# Patient Record
Sex: MALE | Race: ASIAN | NOT HISPANIC OR LATINO
[De-identification: names, ages, dates, MRNs, and addresses within clinical notes are randomized per-mention and may not be internally consistent; named-entity substitution may affect disease eponyms.]

---

## 2017-11-13 ENCOUNTER — APPOINTMENT (OUTPATIENT)
Dept: UROLOGY | Facility: CLINIC | Age: 57
End: 2017-11-13
Payer: COMMERCIAL

## 2017-11-13 DIAGNOSIS — Z00.00 ENCOUNTER FOR GENERAL ADULT MEDICAL EXAMINATION W/OUT ABNORMAL FINDINGS: ICD-10-CM

## 2017-11-13 DIAGNOSIS — N40.1 BENIGN PROSTATIC HYPERPLASIA WITH LOWER URINARY TRACT SYMPMS: ICD-10-CM

## 2017-11-13 LAB
ANION GAP SERPL CALC-SCNC: 15 MMOL/L
APPEARANCE: CLEAR
BACTERIA: NEGATIVE
BILIRUBIN URINE: NEGATIVE
BLOOD URINE: NEGATIVE
BUN SERPL-MCNC: 17 MG/DL
CALCIUM SERPL-MCNC: 10 MG/DL
CHLORIDE SERPL-SCNC: 101 MMOL/L
CO2 SERPL-SCNC: 25 MMOL/L
COLOR: YELLOW
CREAT SERPL-MCNC: 1.01 MG/DL
GLUCOSE QUALITATIVE U: NEGATIVE MG/DL
GLUCOSE SERPL-MCNC: 83 MG/DL
KETONES URINE: NEGATIVE
LEUKOCYTE ESTERASE URINE: NEGATIVE
MICROSCOPIC-UA: NORMAL
NITRITE URINE: NEGATIVE
PH URINE: 7
POTASSIUM SERPL-SCNC: 4.4 MMOL/L
PROTEIN URINE: NEGATIVE MG/DL
PSA FREE FLD-MCNC: 27.1
PSA FREE SERPL-MCNC: 0.29 NG/ML
PSA SERPL-MCNC: 1.08 NG/ML
RED BLOOD CELLS URINE: 2 /HPF
SODIUM SERPL-SCNC: 141 MMOL/L
SPECIFIC GRAVITY URINE: 1.01
SQUAMOUS EPITHELIAL CELLS: 0 /HPF
UROBILINOGEN URINE: NEGATIVE MG/DL
WHITE BLOOD CELLS URINE: 0 /HPF

## 2017-11-13 PROCEDURE — 99204 OFFICE O/P NEW MOD 45 MIN: CPT

## 2018-01-10 ENCOUNTER — APPOINTMENT (OUTPATIENT)
Dept: CARDIOLOGY | Facility: CLINIC | Age: 58
End: 2018-01-10

## 2018-12-10 ENCOUNTER — APPOINTMENT (OUTPATIENT)
Dept: INTERNAL MEDICINE | Facility: CLINIC | Age: 58
End: 2018-12-10
Payer: COMMERCIAL

## 2018-12-10 VITALS
DIASTOLIC BLOOD PRESSURE: 70 MMHG | HEIGHT: 69 IN | HEART RATE: 110 BPM | TEMPERATURE: 98.3 F | BODY MASS INDEX: 27.11 KG/M2 | OXYGEN SATURATION: 96 % | WEIGHT: 183 LBS | SYSTOLIC BLOOD PRESSURE: 120 MMHG

## 2018-12-10 PROCEDURE — 99203 OFFICE O/P NEW LOW 30 MIN: CPT

## 2018-12-27 ENCOUNTER — APPOINTMENT (OUTPATIENT)
Dept: INTERNAL MEDICINE | Facility: CLINIC | Age: 58
End: 2018-12-27

## 2019-01-10 ENCOUNTER — APPOINTMENT (OUTPATIENT)
Dept: INTERNAL MEDICINE | Facility: CLINIC | Age: 59
End: 2019-01-10
Payer: COMMERCIAL

## 2019-01-10 VITALS
HEIGHT: 69 IN | SYSTOLIC BLOOD PRESSURE: 110 MMHG | DIASTOLIC BLOOD PRESSURE: 70 MMHG | OXYGEN SATURATION: 98 % | BODY MASS INDEX: 27.11 KG/M2 | HEART RATE: 74 BPM | WEIGHT: 183 LBS | TEMPERATURE: 97.9 F

## 2019-01-10 PROCEDURE — 99213 OFFICE O/P EST LOW 20 MIN: CPT

## 2019-01-10 NOTE — REVIEW OF SYSTEMS
[Urticaria] : urticaria [Atopic Dermatitis] : atopic dermatitis [Pruritis] : pruritis [Nl] : Respiratory

## 2019-01-10 NOTE — ASSESSMENT
[FreeTextEntry1] : A 58 yrs old pt presents for F/U re\par 1) Chronic urticaria; Unable to tolerate Hydroxyzine due to sleepiness\par I have reviewed the pics that pt has shown me which are CW urticaria\par Trial of Allegra\par Have discussed trial of Xolair for which he is willing\par PA sent today\par \par 2) Contact Derm; Possibly re to Sourav sensitivity\par Pt advised to avoid exposure\par Will RTC for patch testing

## 2019-01-10 NOTE — PHYSICAL EXAM
[Well Nourished] : well nourished [Well Developed] : well developed [Sclera Not Icteric] : sclera not icteric [Conjunctival Erythema] : no conjunctival erythema [Suborbital Bogginess] : no suborbital bogginess (allergic shiners) [Boggy Nasal Turbinates] : no boggy and/or pale nasal turbinates [Pharyngeal erythema] : no pharyngeal erythema [Exudate] : no exudate [Posterior Pharyngeal Cobblestoning] : no posterior pharyngeal cobblestoning [Clear Rhinorrhea] : no clear rhinorrhea was seen [Wheezing] : no wheezing was heard [de-identified] : Area of contact derm on ant abd wall corresponding to Sourav exposureNo urticarial lesion at this time

## 2019-01-10 NOTE — HISTORY OF PRESENT ILLNESS
[de-identified] : The pt presents with C/O hives and itchy skin \par Is unable to use hydroxyzine due to sleepiness\par

## 2019-02-14 ENCOUNTER — MEDICATION RENEWAL (OUTPATIENT)
Age: 59
End: 2019-02-14

## 2019-03-06 ENCOUNTER — APPOINTMENT (OUTPATIENT)
Dept: CARDIOLOGY | Facility: CLINIC | Age: 59
End: 2019-03-06
Payer: COMMERCIAL

## 2019-03-06 ENCOUNTER — NON-APPOINTMENT (OUTPATIENT)
Age: 59
End: 2019-03-06

## 2019-03-06 VITALS
HEIGHT: 69 IN | HEART RATE: 60 BPM | DIASTOLIC BLOOD PRESSURE: 87 MMHG | BODY MASS INDEX: 27.25 KG/M2 | OXYGEN SATURATION: 98 % | SYSTOLIC BLOOD PRESSURE: 146 MMHG | WEIGHT: 184 LBS

## 2019-03-06 DIAGNOSIS — Z87.898 PERSONAL HISTORY OF OTHER SPECIFIED CONDITIONS: ICD-10-CM

## 2019-03-06 DIAGNOSIS — Z71.89 OTHER SPECIFIED COUNSELING: ICD-10-CM

## 2019-03-06 PROCEDURE — 93000 ELECTROCARDIOGRAM COMPLETE: CPT

## 2019-03-06 PROCEDURE — 99203 OFFICE O/P NEW LOW 30 MIN: CPT

## 2019-03-06 RX ORDER — HYDROXYZINE HYDROCHLORIDE 25 MG/1
25 TABLET ORAL
Qty: 60 | Refills: 1 | Status: DISCONTINUED | COMMUNITY
Start: 2018-12-10 | End: 2019-03-06

## 2019-03-06 RX ORDER — TRIAMCINOLONE ACETONIDE 1 MG/G
0.1 OINTMENT TOPICAL TWICE DAILY
Qty: 1 | Refills: 1 | Status: DISCONTINUED | COMMUNITY
Start: 2018-12-10 | End: 2019-03-06

## 2019-03-06 RX ORDER — PREDNISONE 20 MG/1
20 TABLET ORAL
Refills: 0 | Status: DISCONTINUED | COMMUNITY
End: 2019-03-06

## 2019-03-06 NOTE — HISTORY OF PRESENT ILLNESS
[FreeTextEntry1] : Patient with BPH and seasonal allergies presents for cardiac risk assessment. Currently doing well. Denies chest pain, shortness of breath or palpitations. Works in construction. Has a family history of cardiac issues. Mother had cardiomyopathy.

## 2019-03-06 NOTE — DISCUSSION/SUMMARY
[Hypertension] : hypertension [FreeTextEntry1] : \par Currently stable from a cardiovascular standpoint. Borderline hypertensive today. Euvolemic. Asymptomatic. Most recent lipid profile reviewed (nonfasting). Lipids acceptable at this time. Continue current care. For cardiac risk stratification, will schedule an exercise ECG stress test. In addition, will schedule an echocardiogram to assess his cardiac structures and function given borderline high blood pressure. Pending the test results, I will make further recommendations.

## 2019-03-06 NOTE — PHYSICAL EXAM
[General Appearance - Well Developed] : well developed [Normal Appearance] : normal appearance [Well Groomed] : well groomed [General Appearance - Well Nourished] : well nourished [No Deformities] : no deformities [General Appearance - In No Acute Distress] : no acute distress [Normal Conjunctiva] : the conjunctiva exhibited no abnormalities [Eyelids - No Xanthelasma] : the eyelids demonstrated no xanthelasmas [Normal Oral Mucosa] : normal oral mucosa [No Oral Pallor] : no oral pallor [No Oral Cyanosis] : no oral cyanosis [Heart Rate And Rhythm] : heart rate and rhythm were normal [Heart Sounds] : normal S1 and S2 [Murmurs] : no murmurs present [Edema] : no peripheral edema present [Respiration, Rhythm And Depth] : normal respiratory rhythm and effort [Exaggerated Use Of Accessory Muscles For Inspiration] : no accessory muscle use [Auscultation Breath Sounds / Voice Sounds] : lungs were clear to auscultation bilaterally [Abdomen Soft] : soft [Abdomen Tenderness] : non-tender [Abnormal Walk] : normal gait [Gait - Sufficient For Exercise Testing] : the gait was sufficient for exercise testing [Nail Clubbing] : no clubbing of the fingernails [Cyanosis, Localized] : no localized cyanosis [] : no ischemic changes [Skin Color & Pigmentation] : normal skin color and pigmentation [Oriented To Time, Place, And Person] : oriented to person, place, and time [FreeTextEntry1] : no carotid bruits or JVD

## 2019-04-24 ENCOUNTER — APPOINTMENT (OUTPATIENT)
Dept: CV DIAGNOSITCS | Facility: HOSPITAL | Age: 59
End: 2019-04-24
Payer: COMMERCIAL

## 2019-04-24 ENCOUNTER — OUTPATIENT (OUTPATIENT)
Dept: OUTPATIENT SERVICES | Facility: HOSPITAL | Age: 59
LOS: 1 days | End: 2019-04-24

## 2019-04-24 ENCOUNTER — APPOINTMENT (OUTPATIENT)
Dept: CV DIAGNOSTICS | Facility: HOSPITAL | Age: 59
End: 2019-04-24
Payer: COMMERCIAL

## 2019-04-24 DIAGNOSIS — I10 ESSENTIAL (PRIMARY) HYPERTENSION: ICD-10-CM

## 2019-04-24 PROCEDURE — 93018 CV STRESS TEST I&R ONLY: CPT | Mod: GC

## 2019-04-24 PROCEDURE — 93306 TTE W/DOPPLER COMPLETE: CPT | Mod: 26

## 2019-04-24 PROCEDURE — 93016 CV STRESS TEST SUPVJ ONLY: CPT | Mod: GC

## 2019-07-24 ENCOUNTER — MEDICATION RENEWAL (OUTPATIENT)
Age: 59
End: 2019-07-24

## 2020-08-11 ENCOUNTER — LABORATORY RESULT (OUTPATIENT)
Age: 60
End: 2020-08-11

## 2020-08-11 ENCOUNTER — APPOINTMENT (OUTPATIENT)
Dept: INTERNAL MEDICINE | Facility: CLINIC | Age: 60
End: 2020-08-11
Payer: COMMERCIAL

## 2020-08-11 VITALS — SYSTOLIC BLOOD PRESSURE: 120 MMHG | DIASTOLIC BLOOD PRESSURE: 70 MMHG

## 2020-08-11 PROCEDURE — 36415 COLL VENOUS BLD VENIPUNCTURE: CPT

## 2020-08-11 PROCEDURE — 99213 OFFICE O/P EST LOW 20 MIN: CPT | Mod: 25

## 2020-08-12 LAB
BASOPHILS # BLD AUTO: 0.06 K/UL
BASOPHILS NFR BLD AUTO: 1 %
EOSINOPHIL # BLD AUTO: 0.16 K/UL
EOSINOPHIL NFR BLD AUTO: 2.8 %
HCT VFR BLD CALC: 45.9 %
HGB BLD-MCNC: 14.5 G/DL
IMM GRANULOCYTES NFR BLD AUTO: 0.2 %
LYMPHOCYTES # BLD AUTO: 2.3 K/UL
LYMPHOCYTES NFR BLD AUTO: 40 %
MAN DIFF?: NORMAL
MCHC RBC-ENTMCNC: 29.7 PG
MCHC RBC-ENTMCNC: 31.6 GM/DL
MCV RBC AUTO: 94.1 FL
MONOCYTES # BLD AUTO: 0.48 K/UL
MONOCYTES NFR BLD AUTO: 8.3 %
NEUTROPHILS # BLD AUTO: 2.74 K/UL
NEUTROPHILS NFR BLD AUTO: 47.7 %
PLATELET # BLD AUTO: 224 K/UL
RBC # BLD: 4.88 M/UL
RBC # FLD: 12.4 %
WBC # FLD AUTO: 5.75 K/UL

## 2020-08-13 LAB
COMMON WASP (YELLOW JACKET) CLASS: 4
COMMON WASP (YELLOW JACKET) CONC: 27.2 KUA/L
HONEY BEE (I1) CLASS: NORMAL
HONEY BEE (I1) CONC: 0.19 KUA/L
PAPER WASP (I4) CLASS: 3
PAPER WASP (I4) CONC: 5.33 KUA/L

## 2020-08-13 NOTE — HISTORY OF PRESENT ILLNESS
[de-identified] : The pt states receiving a bee sting and developed anaphylatic reaction \par States this was his firt episode of bee sting anaphylaxis\par He was attended by EMS and taken to [facility]\par He had a generalised reaction to tick bite and has an Epi Pen\par

## 2020-08-13 NOTE — PHYSICAL EXAM
[Well Nourished] : well nourished [Normal Pupil & Iris Size/Symmetry] : normal pupil and iris size and symmetry [No Neck Mass] : no neck mass was observed [Normal Rate and Effort] : normal respiratory rhythm and effort [No LAD] : no lymphadenopathy [Normal S1, S2] : normal S1 and S2 [Normal Rate] : heart rate was normal  [Bilateral Audible Breath Sounds] : bilateral audible breath sounds [No murmur] : no murmur [Boggy Nasal Turbinates] : no boggy and/or pale nasal turbinates [Pharyngeal erythema] : no pharyngeal erythema [Posterior Pharyngeal Cobblestoning] : no posterior pharyngeal cobblestoning [Clear Rhinorrhea] : no clear rhinorrhea was seen [Exudate] : no exudate [Wheezing] : no wheezing was heard

## 2020-09-29 ENCOUNTER — APPOINTMENT (OUTPATIENT)
Dept: PEDIATRIC ALLERGY IMMUNOLOGY | Facility: CLINIC | Age: 60
End: 2020-09-29
Payer: COMMERCIAL

## 2020-09-29 VITALS
OXYGEN SATURATION: 97 % | BODY MASS INDEX: 26.36 KG/M2 | HEART RATE: 70 BPM | SYSTOLIC BLOOD PRESSURE: 128 MMHG | WEIGHT: 178.49 LBS | TEMPERATURE: 97.8 F | DIASTOLIC BLOOD PRESSURE: 86 MMHG

## 2020-09-29 DIAGNOSIS — L50.1 IDIOPATHIC URTICARIA: ICD-10-CM

## 2020-09-29 DIAGNOSIS — Z84.0 FAMILY HISTORY OF DISEASES OF THE SKIN AND SUBCUTANEOUS TISSUE: ICD-10-CM

## 2020-09-29 PROCEDURE — 99204 OFFICE O/P NEW MOD 45 MIN: CPT | Mod: 25

## 2020-09-29 PROCEDURE — 95044 PATCH/APPLICATION TESTS: CPT

## 2020-09-30 LAB
ALBUMIN SERPL ELPH-MCNC: 5.2 G/DL
ALP BLD-CCNC: 98 U/L
ALT SERPL-CCNC: 20 U/L
ANION GAP SERPL CALC-SCNC: 13 MMOL/L
AST SERPL-CCNC: 19 U/L
BILIRUB SERPL-MCNC: 0.4 MG/DL
BUN SERPL-MCNC: 15 MG/DL
CALCIUM SERPL-MCNC: 10.4 MG/DL
CHLORIDE SERPL-SCNC: 101 MMOL/L
CO2 SERPL-SCNC: 25 MMOL/L
CREAT SERPL-MCNC: 0.89 MG/DL
CRP SERPL-MCNC: <0.1 MG/DL
GLUCOSE SERPL-MCNC: 80 MG/DL
POTASSIUM SERPL-SCNC: 4.4 MMOL/L
PROT SERPL-MCNC: 8 G/DL
SODIUM SERPL-SCNC: 140 MMOL/L
THYROGLOB AB SERPL-ACNC: <20 IU/ML
THYROPEROXIDASE AB SERPL IA-ACNC: <10 IU/ML
TSH SERPL-ACNC: 1.04 UIU/ML

## 2020-10-01 ENCOUNTER — APPOINTMENT (OUTPATIENT)
Dept: PEDIATRIC ALLERGY IMMUNOLOGY | Facility: CLINIC | Age: 60
End: 2020-10-01
Payer: COMMERCIAL

## 2020-10-01 VITALS
TEMPERATURE: 97.4 F | WEIGHT: 178 LBS | BODY MASS INDEX: 26.36 KG/M2 | OXYGEN SATURATION: 97 % | DIASTOLIC BLOOD PRESSURE: 85 MMHG | HEART RATE: 77 BPM | SYSTOLIC BLOOD PRESSURE: 131 MMHG | HEIGHT: 69 IN

## 2020-10-01 PROCEDURE — 99213 OFFICE O/P EST LOW 20 MIN: CPT

## 2020-10-01 RX ORDER — PREDNISONE 10 MG/1
10 TABLET ORAL
Qty: 13 | Refills: 0 | Status: COMPLETED | COMMUNITY
Start: 2020-07-15

## 2020-10-01 NOTE — PHYSICAL EXAM
[Alert] : alert [Well Nourished] : well nourished [Healthy Appearance] : healthy appearance [No Acute Distress] : no acute distress [Well Developed] : well developed [Normal Pupil & Iris Size/Symmetry] : normal pupil and iris size and symmetry [No Discharge] : no discharge [No Photophobia] : no photophobia [Sclera Not Icteric] : sclera not icteric [Normal Outer Ear/Nose] : the ears and nose were normal in appearance [Supple] : the neck was supple [Normal Rate and Effort] : normal respiratory rhythm and effort [No Retractions] : no retractions [Normal Cervical Lymph Nodes] : cervical [Skin Intact] : skin intact  [No Rash] : no rash [No Skin Lesions] : no skin lesions [No Cyanosis] : no cyanosis [Normal Mood] : mood was normal [Normal Affect] : affect was normal [Alert, Awake, Oriented as Age-Appropriate] : alert, awake, oriented as age appropriate

## 2020-10-01 NOTE — REASON FOR VISIT
[Routine Follow-Up] : a routine follow-up visit for [FreeTextEntry2] : contact dermatitis, patch removal and initial patch test reading at 48 hours.

## 2020-10-01 NOTE — IMPRESSION
[FreeTextEntry1] : Patch test positive to:  \par Fragrance mix II, Polymyxin B sulfate, Cananga odorata, 1,3-diphenylguanidine, cetylsteaylalcohol, Fragrance Mix, Methyldibromo Glutaronitrile, Thimerosal, Gold Sodium Thiosulfate, ?Wool Alcohols, ? Tixocortol-21-Pivalate

## 2020-10-01 NOTE — REVIEW OF SYSTEMS
[Urticaria] : urticaria [Pruritus] : pruritus [Seizure] : no seizures [Headache] : no headache [Atopic Dermatitis] : no atopic dermatitis [Swelling] : no swelling [Nl] : Constitutional

## 2020-10-01 NOTE — HISTORY OF PRESENT ILLNESS
[Asthma] : asthma [Eczematous rashes] : eczematous rashes [Food Allergies] : food allergies [de-identified] : GILBERT ALCANTARA is a 60 year  old male with hymenoptera allergy and concern for allergic contact dermatitis, presents for patch removal and first patch test reading at 48 hours:\par \par Patient reports pruritus of patch test sites, no tenderness. Patient feels well today..\par \par He has a history of contact dermatitis with certain shampoos, lotions, bandaids. He had patch test in a distant past and reports positive tests to nickel and something else. He currently uses [employer] shampoo, which he tolerates.

## 2020-10-02 ENCOUNTER — APPOINTMENT (OUTPATIENT)
Dept: PEDIATRIC ALLERGY IMMUNOLOGY | Facility: CLINIC | Age: 60
End: 2020-10-02
Payer: COMMERCIAL

## 2020-10-02 VITALS
TEMPERATURE: 97.2 F | HEIGHT: 68.98 IN | WEIGHT: 179.99 LBS | HEART RATE: 79 BPM | BODY MASS INDEX: 26.66 KG/M2 | OXYGEN SATURATION: 98 % | SYSTOLIC BLOOD PRESSURE: 142 MMHG | DIASTOLIC BLOOD PRESSURE: 88 MMHG

## 2020-10-02 LAB
TOTAL IGE SMQN RAST: 42 KU/L
TRYPTASE: 1.6 NG/ML

## 2020-10-02 PROCEDURE — 99213 OFFICE O/P EST LOW 20 MIN: CPT

## 2020-10-02 NOTE — HISTORY OF PRESENT ILLNESS
[Asthma] : asthma [Food Allergies] : food allergies [de-identified] : 60 year old male being seen currently due to history of multiple contact type reactions that led to the placement of patch testing on Tuesday, which was read yesterday.  Patient has been itchy on the right side of the back, upper and lower. Also reports reactions when around alcohol based sanitizers at some times.

## 2020-10-02 NOTE — IMPRESSION
[FreeTextEntry1] : + : Fragrance mix, polymyxin B sulfate, cinnamic aldehyde, cananga adorata, cetylstearye alcohol, methyldibromo glutaonitrile, thimerosal, gold sodium theosulfate

## 2020-10-02 NOTE — REASON FOR VISIT
[Routine Follow-Up] : a routine follow-up visit for [FreeTextEntry2] : final reading of patch tests, contact dermatitis

## 2020-10-02 NOTE — PHYSICAL EXAM
[Alert] : alert [Well Nourished] : well nourished [Healthy Appearance] : healthy appearance [No Acute Distress] : no acute distress [Well Developed] : well developed [No Discharge] : no discharge [No Photophobia] : no photophobia [Sclera Not Icteric] : sclera not icteric [Normal Lips/Tongue] : the lips and tongue were normal [Normal Outer Ear/Nose] : the ears and nose were normal in appearance [No Nasal Discharge] : no nasal discharge [Supple] : the neck was supple [Normal Rate and Effort] : normal respiratory rhythm and effort [No Retractions] : no retractions [Bilateral Audible Breath Sounds] : bilateral audible breath sounds [Skin Intact] : skin intact  [No Rash] : no rash [Eczematous Patches] : no eczematous patches [Normal Mood] : mood was normal [Normal Affect] : affect was normal [Alert, Awake, Oriented as Age-Appropriate] : alert, awake, oriented as age appropriate

## 2020-10-04 NOTE — REVIEW OF SYSTEMS
[Urticaria] : urticaria [Pruritus] : pruritus [Nl] : Hematologic/Lymphatic [Fatigue] : no fatigue [Fever] : no fever [Seizure] : no seizures [Headache] : no headache [Atopic Dermatitis] : no atopic dermatitis [Swelling] : no swelling

## 2020-10-04 NOTE — CONSULT LETTER
[Dear  ___] : Dear  [unfilled], [Consult Letter:] : I had the pleasure of evaluating your patient, [unfilled]. [Please see my note below.] : Please see my note below. [Consult Closing:] : Thank you very much for allowing me to participate in the care of this patient.  If you have any questions, please do not hesitate to contact me. [Sincerely,] : Sincerely, [FreeTextEntry3] : Dayna Doe MD FAARENNY, ALEX\par Adult and Pediatric Allergy, Asthma and Clinical Immunology\par  of Medicine and Pediatrics at\par   [facility]\par Section Head, Adult Allergy and Immunology\par   [facility]\par   Division of Allergy, Asthma and Immunology\par   [address] [place] [address], [place] [address]\par   [place] [address]\par   Phone [phone]  Fax [phone]\par \par

## 2020-10-04 NOTE — PHYSICAL EXAM
[Alert] : alert [Well Nourished] : well nourished [Healthy Appearance] : healthy appearance [No Acute Distress] : no acute distress [No Discharge] : no discharge [No Photophobia] : no photophobia [Sclera Not Icteric] : sclera not icteric [Conjunctival Erythema] : no conjunctival erythema [Normal TMs] : both tympanic membranes were normal [Normal Lips/Tongue] : the lips and tongue were normal [Normal Outer Ear/Nose] : the ears and nose were normal in appearance [No Thrush] : no thrush [No Oral Lesions or Ulcers] : no oral lesions or ulcers [Pale mucosa] : pale mucosa [Boggy Nasal Turbinates] : no boggy and/or pale nasal turbinates [Pharyngeal erythema] : no pharyngeal erythema [Posterior Pharyngeal Cobblestoning] : no posterior pharyngeal cobblestoning [Exudate] : no exudate [No Neck Mass] : no neck mass was observed [Supple] : the neck was supple [Normal Rate and Effort] : normal respiratory rhythm and effort [Normal Palpation] : palpation of the chest revealed no abnormalities [No Crackles] : no crackles [No Retractions] : no retractions [Bilateral Audible Breath Sounds] : bilateral audible breath sounds [Wheezing] : no wheezing was heard [Normal Rate] : heart rate was normal  [Normal S1, S2] : normal S1 and S2 [Regular Rhythm] : with a regular rhythm [Soft] : abdomen soft [Not Tender] : non-tender [No HSM] : no hepato-splenomegaly [Normal Cervical Lymph Nodes] : cervical [Skin Intact] : skin intact  [No Rash] : no rash [Eczematous Patches] : no eczematous patches [No clubbing] : no clubbing [No Edema] : no edema [No Cyanosis] : no cyanosis [Normal Mood] : mood was normal [Normal Affect] : affect was normal [Alert, Awake, Oriented as Age-Appropriate] : alert, awake, oriented as age appropriate

## 2020-10-04 NOTE — HISTORY OF PRESENT ILLNESS
[Asthma] : asthma [Eczematous rashes] : eczematous rashes [Food Allergies] : food allergies [de-identified] : GILBERT ALCANTARA is a 60 year  old male, presents for allergy evaluation. HE was referred by Dr Navarrete for venom testing and IT. \par \par 7/13/20- was stung by a bee. Within a minute developed severe itching on the feet, generalized hives, then numbness and swelling of the face and eventually passed out in urgent care and was taken to [facility] where he stayed for 2 nights. \par He reports being stung multiple times before with only local  reactions. 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 \par \par About 2 years ago he was bitten by a tick and developed large local reaction, not sure due to a tick or creams. He took Fexofenadine 180 mg, hasn't taken any since 6/2020. He does get occasional itchy hives. \par \par He has a history of contact dermatitis with certain shampoos, lotions, bandaids. He had patch test in a distant past and reports positive tests to nickel and something else. He currently uses [employer] shampoo, which he tolerates.

## 2020-10-09 LAB
IGE RECEPTOR AB: 9.4 %
IGE RECEPTOR COMMENT: NORMAL

## 2020-10-22 ENCOUNTER — APPOINTMENT (OUTPATIENT)
Dept: PEDIATRIC ALLERGY IMMUNOLOGY | Facility: CLINIC | Age: 60
End: 2020-10-22
Payer: COMMERCIAL

## 2020-10-22 DIAGNOSIS — L23.0 ALLERGIC CONTACT DERMATITIS DUE TO METALS: ICD-10-CM

## 2020-10-22 DIAGNOSIS — L23.1 ALLERGIC CONTACT DERMATITIS DUE TO ADHESIVES: ICD-10-CM

## 2020-10-22 PROCEDURE — 99215 OFFICE O/P EST HI 40 MIN: CPT | Mod: 95

## 2020-10-22 NOTE — HISTORY OF PRESENT ILLNESS
[Home] : at home, [unfilled] , at the time of the visit. [Other Location: e.g. 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 No interval episodes of urticaria or bee stings. \par \par HYMENOPTERA VENOM ALLERGY:\par History of systemic reaction after a sting is an indication for further testing.\par - IgE positive to YELLOW JACKET AND WASP\par \par CONTACT DERMATITIS:\par Patch test positive to Fragrance mix , polymyxin B sulfate, cinnamic aldehyde, cananga odorata, cetylstearylalcohol, mehyldibromoglutanonitrile, thimerosal, ?- gold sodium  thiosulfate\par \par

## 2020-10-22 NOTE — REVIEW OF SYSTEMS
[Fatigue] : no fatigue [Fever] : no fever [Seizure] : no seizures [Headache] : no headache [Atopic Dermatitis] : no atopic dermatitis [Swelling] : no swelling [Nl] : Hematologic/Lymphatic [de-identified] : contact dermatitis

## 2020-10-22 NOTE — PHYSICAL EXAM
[Alert] : alert [No Acute Distress] : no acute distress [Normal Voice/Communication] : normal voice communication [Normal Rate and Effort] : normal respiratory rhythm and effort [Normal Mood] : mood was normal [Normal Affect] : affect was normal [Alert, Awake, Oriented as Age-Appropriate] : alert, awake, oriented as age appropriate

## 2020-11-05 ENCOUNTER — TRANSCRIPTION ENCOUNTER (OUTPATIENT)
Age: 60
End: 2020-11-05

## 2020-11-17 ENCOUNTER — APPOINTMENT (OUTPATIENT)
Dept: PEDIATRIC ALLERGY IMMUNOLOGY | Facility: CLINIC | Age: 60
End: 2020-11-17
Payer: COMMERCIAL

## 2020-11-17 VITALS
DIASTOLIC BLOOD PRESSURE: 87 MMHG | TEMPERATURE: 97.1 F | HEIGHT: 69 IN | HEART RATE: 62 BPM | WEIGHT: 180 LBS | BODY MASS INDEX: 26.66 KG/M2 | OXYGEN SATURATION: 98 % | SYSTOLIC BLOOD PRESSURE: 138 MMHG

## 2020-11-17 DIAGNOSIS — Z01.89 ENCOUNTER FOR OTHER SPECIFIED SPECIAL EXAMINATIONS: ICD-10-CM

## 2020-11-17 DIAGNOSIS — L23.89 ALLERGIC CONTACT DERMATITIS DUE TO OTHER AGENTS: ICD-10-CM

## 2020-11-17 PROCEDURE — 99214 OFFICE O/P EST MOD 30 MIN: CPT | Mod: 25

## 2020-11-17 PROCEDURE — 95017 ALL TSTG PERQ&IQ W/VENOMS: CPT

## 2020-11-20 NOTE — PHYSICAL EXAM
[Alert] : alert [Well Nourished] : well nourished [Healthy Appearance] : healthy appearance [No Acute Distress] : no acute distress [Normal Voice/Communication] : normal voice communication [Sclera Not Icteric] : sclera not icteric [Conjunctival Erythema] : no conjunctival erythema [No Thrush] : no thrush [No Oral Lesions or Ulcers] : no oral lesions or ulcers [Exudate] : no exudate [Normal Rate and Effort] : normal respiratory rhythm and effort [No Crackles] : no crackles [Bilateral Audible Breath Sounds] : bilateral audible breath sounds [Wheezing] : no wheezing was heard [Normal Rate] : heart rate was normal  [Regular Rhythm] : with a regular rhythm [Soft] : abdomen soft [Not Tender] : non-tender [Normal Cervical Lymph Nodes] : cervical [No Rash] : no rash [Eczematous Patches] : no eczematous patches [No clubbing] : no clubbing [No Cyanosis] : no cyanosis [Normal Mood] : mood was normal [Normal Affect] : affect was normal [Alert, Awake, Oriented as Age-Appropriate] : alert, awake, oriented as age appropriate

## 2020-11-20 NOTE — REVIEW OF SYSTEMS
[Fatigue] : no fatigue [Fever] : no fever [Seizure] : no seizures [Headache] : no headache [Atopic Dermatitis] : no atopic dermatitis [Swelling] : no swelling [Nl] : Hematologic/Lymphatic [de-identified] : contact dermatitis

## 2020-11-20 NOTE — HISTORY OF PRESENT ILLNESS
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 No interval episodes of urticaria or bee stings. \par - returns to complete venom testing\par \par HYMENOPTERA VENOM ALLERGY:\par History of systemic reaction after a sting is an indication for further testing.\par - IgE positive to YELLOW JACKET AND WASP\par \par CONTACT DERMATITIS:\par Patch test positive to Fragrance mix , polymyxin B sulfate, cinnamic aldehyde, cananga odorata, cetylstearylalcohol, mehyldibromoglutanonitrile, thimerosal, ?- gold sodium  thiosulfate\par \par

## 2020-11-20 NOTE — REASON FOR VISIT
[Routine Follow-Up] : a routine follow-up visit for [To Insect Venom] : allergy to insect venom [Spouse] : spouse

## 2020-12-30 ENCOUNTER — NON-APPOINTMENT (OUTPATIENT)
Age: 60
End: 2020-12-30

## 2021-01-06 ENCOUNTER — APPOINTMENT (OUTPATIENT)
Dept: PEDIATRIC ALLERGY IMMUNOLOGY | Facility: CLINIC | Age: 61
End: 2021-01-06
Payer: COMMERCIAL

## 2021-01-06 PROCEDURE — 95149Z: CUSTOM

## 2021-01-06 PROCEDURE — 95117 IMMUNOTHERAPY INJECTIONS: CPT

## 2021-01-06 PROCEDURE — 99072 ADDL SUPL MATRL&STAF TM PHE: CPT

## 2021-01-12 ENCOUNTER — APPOINTMENT (OUTPATIENT)
Dept: PEDIATRIC ALLERGY IMMUNOLOGY | Facility: CLINIC | Age: 61
End: 2021-01-12
Payer: COMMERCIAL

## 2021-01-12 PROCEDURE — 95149Z: CUSTOM

## 2021-01-12 PROCEDURE — 95117 IMMUNOTHERAPY INJECTIONS: CPT

## 2021-01-12 PROCEDURE — 99072 ADDL SUPL MATRL&STAF TM PHE: CPT

## 2021-01-21 ENCOUNTER — APPOINTMENT (OUTPATIENT)
Dept: PEDIATRIC ALLERGY IMMUNOLOGY | Facility: CLINIC | Age: 61
End: 2021-01-21
Payer: COMMERCIAL

## 2021-01-21 PROCEDURE — 95149Z: CUSTOM

## 2021-01-21 PROCEDURE — 95117 IMMUNOTHERAPY INJECTIONS: CPT

## 2021-01-21 PROCEDURE — 99072 ADDL SUPL MATRL&STAF TM PHE: CPT

## 2021-01-27 ENCOUNTER — APPOINTMENT (OUTPATIENT)
Dept: PEDIATRIC ALLERGY IMMUNOLOGY | Facility: CLINIC | Age: 61
End: 2021-01-27
Payer: COMMERCIAL

## 2021-01-27 PROCEDURE — 99072 ADDL SUPL MATRL&STAF TM PHE: CPT

## 2021-01-27 PROCEDURE — 95117 IMMUNOTHERAPY INJECTIONS: CPT

## 2021-01-27 PROCEDURE — 95149Z: CUSTOM

## 2021-02-03 ENCOUNTER — APPOINTMENT (OUTPATIENT)
Dept: PEDIATRIC ALLERGY IMMUNOLOGY | Facility: CLINIC | Age: 61
End: 2021-02-03
Payer: COMMERCIAL

## 2021-02-03 PROCEDURE — 95149Z: CUSTOM

## 2021-02-03 PROCEDURE — 99072 ADDL SUPL MATRL&STAF TM PHE: CPT

## 2021-02-03 PROCEDURE — 95117 IMMUNOTHERAPY INJECTIONS: CPT

## 2021-02-11 ENCOUNTER — APPOINTMENT (OUTPATIENT)
Dept: PEDIATRIC ALLERGY IMMUNOLOGY | Facility: CLINIC | Age: 61
End: 2021-02-11
Payer: COMMERCIAL

## 2021-02-11 PROCEDURE — 95149Z: CUSTOM

## 2021-02-11 PROCEDURE — 95117 IMMUNOTHERAPY INJECTIONS: CPT

## 2021-02-11 PROCEDURE — 99072 ADDL SUPL MATRL&STAF TM PHE: CPT

## 2021-02-16 ENCOUNTER — APPOINTMENT (OUTPATIENT)
Dept: PEDIATRIC ALLERGY IMMUNOLOGY | Facility: CLINIC | Age: 61
End: 2021-02-16

## 2021-02-17 ENCOUNTER — NON-APPOINTMENT (OUTPATIENT)
Age: 61
End: 2021-02-17

## 2021-02-17 ENCOUNTER — APPOINTMENT (OUTPATIENT)
Dept: PEDIATRIC ALLERGY IMMUNOLOGY | Facility: CLINIC | Age: 61
End: 2021-02-17
Payer: COMMERCIAL

## 2021-02-17 PROCEDURE — 99072 ADDL SUPL MATRL&STAF TM PHE: CPT

## 2021-02-17 PROCEDURE — 95149Z: CUSTOM

## 2021-02-17 PROCEDURE — 95117 IMMUNOTHERAPY INJECTIONS: CPT

## 2021-02-24 ENCOUNTER — APPOINTMENT (OUTPATIENT)
Dept: PEDIATRIC ALLERGY IMMUNOLOGY | Facility: CLINIC | Age: 61
End: 2021-02-24
Payer: COMMERCIAL

## 2021-02-24 PROCEDURE — 95149Z: CUSTOM

## 2021-02-24 PROCEDURE — 95117 IMMUNOTHERAPY INJECTIONS: CPT

## 2021-02-24 PROCEDURE — 99072 ADDL SUPL MATRL&STAF TM PHE: CPT

## 2021-03-03 ENCOUNTER — APPOINTMENT (OUTPATIENT)
Dept: PEDIATRIC ALLERGY IMMUNOLOGY | Facility: CLINIC | Age: 61
End: 2021-03-03
Payer: COMMERCIAL

## 2021-03-03 PROCEDURE — 99072 ADDL SUPL MATRL&STAF TM PHE: CPT

## 2021-03-03 PROCEDURE — 95117 IMMUNOTHERAPY INJECTIONS: CPT

## 2021-03-03 PROCEDURE — 95149Z: CUSTOM

## 2021-03-10 ENCOUNTER — APPOINTMENT (OUTPATIENT)
Dept: PEDIATRIC ALLERGY IMMUNOLOGY | Facility: CLINIC | Age: 61
End: 2021-03-10

## 2021-03-17 ENCOUNTER — APPOINTMENT (OUTPATIENT)
Dept: PEDIATRIC ALLERGY IMMUNOLOGY | Facility: CLINIC | Age: 61
End: 2021-03-17
Payer: COMMERCIAL

## 2021-03-17 PROCEDURE — 99072 ADDL SUPL MATRL&STAF TM PHE: CPT

## 2021-03-17 PROCEDURE — 95149Z: CUSTOM

## 2021-03-17 PROCEDURE — 95117 IMMUNOTHERAPY INJECTIONS: CPT

## 2021-03-24 ENCOUNTER — APPOINTMENT (OUTPATIENT)
Dept: PEDIATRIC ALLERGY IMMUNOLOGY | Facility: CLINIC | Age: 61
End: 2021-03-24
Payer: COMMERCIAL

## 2021-03-24 PROCEDURE — 95149Z: CUSTOM

## 2021-03-24 PROCEDURE — 99072 ADDL SUPL MATRL&STAF TM PHE: CPT

## 2021-03-24 PROCEDURE — 95117 IMMUNOTHERAPY INJECTIONS: CPT

## 2021-03-31 ENCOUNTER — APPOINTMENT (OUTPATIENT)
Dept: PEDIATRIC ALLERGY IMMUNOLOGY | Facility: CLINIC | Age: 61
End: 2021-03-31
Payer: COMMERCIAL

## 2021-03-31 PROCEDURE — 95117 IMMUNOTHERAPY INJECTIONS: CPT

## 2021-03-31 PROCEDURE — 95165 ANTIGEN THERAPY SERVICES: CPT

## 2021-04-07 ENCOUNTER — APPOINTMENT (OUTPATIENT)
Dept: PEDIATRIC ALLERGY IMMUNOLOGY | Facility: CLINIC | Age: 61
End: 2021-04-07
Payer: COMMERCIAL

## 2021-04-07 PROCEDURE — 95165 ANTIGEN THERAPY SERVICES: CPT

## 2021-04-07 PROCEDURE — 95117 IMMUNOTHERAPY INJECTIONS: CPT

## 2021-04-14 ENCOUNTER — APPOINTMENT (OUTPATIENT)
Dept: PEDIATRIC ALLERGY IMMUNOLOGY | Facility: CLINIC | Age: 61
End: 2021-04-14
Payer: COMMERCIAL

## 2021-04-14 PROCEDURE — 95149Z: CUSTOM

## 2021-04-14 PROCEDURE — 95117 IMMUNOTHERAPY INJECTIONS: CPT

## 2021-04-14 PROCEDURE — 99072 ADDL SUPL MATRL&STAF TM PHE: CPT

## 2021-04-21 ENCOUNTER — APPOINTMENT (OUTPATIENT)
Dept: PEDIATRIC ALLERGY IMMUNOLOGY | Facility: CLINIC | Age: 61
End: 2021-04-21
Payer: COMMERCIAL

## 2021-04-21 PROCEDURE — 95149Z: CUSTOM

## 2021-04-21 PROCEDURE — 95117 IMMUNOTHERAPY INJECTIONS: CPT

## 2021-04-21 PROCEDURE — 99072 ADDL SUPL MATRL&STAF TM PHE: CPT

## 2021-04-28 ENCOUNTER — APPOINTMENT (OUTPATIENT)
Dept: PEDIATRIC ALLERGY IMMUNOLOGY | Facility: CLINIC | Age: 61
End: 2021-04-28
Payer: COMMERCIAL

## 2021-04-28 PROCEDURE — 99072 ADDL SUPL MATRL&STAF TM PHE: CPT

## 2021-04-28 PROCEDURE — 95149Z: CUSTOM

## 2021-04-28 PROCEDURE — 95117 IMMUNOTHERAPY INJECTIONS: CPT

## 2021-05-07 ENCOUNTER — APPOINTMENT (OUTPATIENT)
Dept: PEDIATRIC ALLERGY IMMUNOLOGY | Facility: CLINIC | Age: 61
End: 2021-05-07

## 2021-05-12 ENCOUNTER — APPOINTMENT (OUTPATIENT)
Dept: PEDIATRIC ALLERGY IMMUNOLOGY | Facility: CLINIC | Age: 61
End: 2021-05-12
Payer: COMMERCIAL

## 2021-05-12 PROCEDURE — 95149Z: CUSTOM

## 2021-05-12 PROCEDURE — 99072 ADDL SUPL MATRL&STAF TM PHE: CPT

## 2021-05-12 PROCEDURE — 95117 IMMUNOTHERAPY INJECTIONS: CPT

## 2021-05-19 ENCOUNTER — APPOINTMENT (OUTPATIENT)
Dept: PEDIATRIC ALLERGY IMMUNOLOGY | Facility: CLINIC | Age: 61
End: 2021-05-19
Payer: COMMERCIAL

## 2021-05-19 PROCEDURE — 95149Z: CUSTOM

## 2021-05-19 PROCEDURE — 95117 IMMUNOTHERAPY INJECTIONS: CPT

## 2021-05-19 PROCEDURE — 99072 ADDL SUPL MATRL&STAF TM PHE: CPT

## 2021-05-26 ENCOUNTER — APPOINTMENT (OUTPATIENT)
Dept: PEDIATRIC ALLERGY IMMUNOLOGY | Facility: CLINIC | Age: 61
End: 2021-05-26

## 2021-06-02 ENCOUNTER — APPOINTMENT (OUTPATIENT)
Dept: PEDIATRIC ALLERGY IMMUNOLOGY | Facility: CLINIC | Age: 61
End: 2021-06-02
Payer: COMMERCIAL

## 2021-06-02 PROCEDURE — 99072 ADDL SUPL MATRL&STAF TM PHE: CPT

## 2021-06-02 PROCEDURE — 95117 IMMUNOTHERAPY INJECTIONS: CPT

## 2021-06-02 PROCEDURE — 95149Z: CUSTOM

## 2021-06-04 ENCOUNTER — RX RENEWAL (OUTPATIENT)
Age: 61
End: 2021-06-04

## 2021-06-04 RX ORDER — AMLODIPINE BESYLATE 5 MG/1
5 TABLET ORAL
Qty: 90 | Refills: 0 | Status: ACTIVE | COMMUNITY
Start: 2019-04-29

## 2021-06-16 ENCOUNTER — APPOINTMENT (OUTPATIENT)
Dept: PEDIATRIC ALLERGY IMMUNOLOGY | Facility: CLINIC | Age: 61
End: 2021-06-16
Payer: COMMERCIAL

## 2021-06-16 PROCEDURE — 95149Z: CUSTOM

## 2021-06-16 PROCEDURE — 95117 IMMUNOTHERAPY INJECTIONS: CPT

## 2021-06-16 PROCEDURE — 99072 ADDL SUPL MATRL&STAF TM PHE: CPT

## 2021-06-30 ENCOUNTER — APPOINTMENT (OUTPATIENT)
Dept: CARDIOLOGY | Facility: CLINIC | Age: 61
End: 2021-06-30

## 2021-07-07 ENCOUNTER — APPOINTMENT (OUTPATIENT)
Dept: PEDIATRIC ALLERGY IMMUNOLOGY | Facility: CLINIC | Age: 61
End: 2021-07-07
Payer: COMMERCIAL

## 2021-07-07 PROCEDURE — 95149Z: CUSTOM

## 2021-07-07 PROCEDURE — 95117 IMMUNOTHERAPY INJECTIONS: CPT

## 2021-07-07 PROCEDURE — 99072 ADDL SUPL MATRL&STAF TM PHE: CPT

## 2021-07-28 ENCOUNTER — APPOINTMENT (OUTPATIENT)
Dept: PEDIATRIC ALLERGY IMMUNOLOGY | Facility: CLINIC | Age: 61
End: 2021-07-28

## 2021-08-11 ENCOUNTER — APPOINTMENT (OUTPATIENT)
Dept: PEDIATRIC ALLERGY IMMUNOLOGY | Facility: CLINIC | Age: 61
End: 2021-08-11
Payer: COMMERCIAL

## 2021-08-11 PROCEDURE — 95147 ANTIGEN THERAPY SERVICES: CPT

## 2021-08-11 PROCEDURE — 95117 IMMUNOTHERAPY INJECTIONS: CPT

## 2021-09-01 ENCOUNTER — APPOINTMENT (OUTPATIENT)
Dept: PEDIATRIC ALLERGY IMMUNOLOGY | Facility: CLINIC | Age: 61
End: 2021-09-01

## 2021-09-01 PROCEDURE — 95117 IMMUNOTHERAPY INJECTIONS: CPT | Mod: 1L

## 2021-09-22 ENCOUNTER — APPOINTMENT (OUTPATIENT)
Dept: PEDIATRIC ALLERGY IMMUNOLOGY | Facility: CLINIC | Age: 61
End: 2021-09-22

## 2021-09-22 PROCEDURE — 95117 IMMUNOTHERAPY INJECTIONS: CPT | Mod: 1L

## 2021-10-07 ENCOUNTER — APPOINTMENT (OUTPATIENT)
Dept: PEDIATRIC ALLERGY IMMUNOLOGY | Facility: CLINIC | Age: 61
End: 2021-10-07
Payer: COMMERCIAL

## 2021-10-07 DIAGNOSIS — L29.9 PRURITUS, UNSPECIFIED: ICD-10-CM

## 2021-10-07 DIAGNOSIS — L85.3 XEROSIS CUTIS: ICD-10-CM

## 2021-10-07 PROCEDURE — 99213 OFFICE O/P EST LOW 20 MIN: CPT | Mod: 95

## 2021-10-07 NOTE — HISTORY OF PRESENT ILLNESS
[Home] : at home, [unfilled] , at the time of the visit. [Other Location: e.g. 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 He had a systemic reaction with swelling and fainting 7/13/21. \par \par - 3 weeks ago he developed about 30 itchy ?hives. He went to a dermatologist, who prescribed prednisone, rash disappeared but his legs continue to be itchy. His wife told him that his skin is very dry and gave him some kind of ointment but he is not using it. There were no associated shortness of breath or other allergic symptoms.  He has been taking Fexofenadine but itching persists. \par \par \par - He has been well, no interval issues. No interval episodes of urticaria or bee stings. \par - returns to complete venom testing\par \par HYMENOPTERA VENOM ALLERGY:\par History of systemic reaction after a sting is an indication for further testing.\par - IgE positive to YELLOW JACKET AND WASP\par \par CONTACT DERMATITIS:\par Patch test positive to Fragrance mix , polymyxin B sulfate, cinnamic aldehyde, cananga odorata, cetylstearylalcohol, mehyldibromoglutanonitrile, thimerosal, ?- gold sodium  thiosulfate\par \par

## 2021-10-07 NOTE — PHYSICAL EXAM
[Alert] : alert [Healthy Appearance] : healthy appearance [No Acute Distress] : no acute distress [Normal Rate and Effort] : normal respiratory rhythm and effort [Normal Mood] : mood was normal [Normal Affect] : affect was normal [Alert, Awake, Oriented as Age-Appropriate] : alert, awake, oriented as age appropriate [de-identified] : can't see the rash

## 2021-10-07 NOTE — PHYSICAL EXAM
[Alert] : alert [Healthy Appearance] : healthy appearance [No Acute Distress] : no acute distress [Normal Rate and Effort] : normal respiratory rhythm and effort [Normal Mood] : mood was normal [Normal Affect] : affect was normal [Alert, Awake, Oriented as Age-Appropriate] : alert, awake, oriented as age appropriate [de-identified] : can't see the rash

## 2021-10-13 ENCOUNTER — APPOINTMENT (OUTPATIENT)
Dept: PEDIATRIC ALLERGY IMMUNOLOGY | Facility: CLINIC | Age: 61
End: 2021-10-13

## 2021-10-13 VITALS
DIASTOLIC BLOOD PRESSURE: 84 MMHG | HEIGHT: 69 IN | BODY MASS INDEX: 26.66 KG/M2 | OXYGEN SATURATION: 97 % | HEART RATE: 87 BPM | SYSTOLIC BLOOD PRESSURE: 129 MMHG | WEIGHT: 180 LBS | TEMPERATURE: 96.2 F

## 2021-10-13 DIAGNOSIS — L23.7 ALLERGIC CONTACT DERMATITIS DUE TO PLANTS, EXCEPT FOOD: ICD-10-CM

## 2021-10-13 PROCEDURE — 95117 IMMUNOTHERAPY INJECTIONS: CPT | Mod: 1L

## 2021-10-13 PROCEDURE — 99213 OFFICE O/P EST LOW 20 MIN: CPT | Mod: 1L,25

## 2021-10-15 NOTE — PHYSICAL EXAM
[Alert] : alert [Well Nourished] : well nourished [No Acute Distress] : no acute distress [Well Developed] : well developed [Sclera Not Icteric] : sclera not icteric [Normal TMs] : both tympanic membranes were normal [Normal Rate and Effort] : normal respiratory rhythm and effort [No Crackles] : no crackles [Normal Rate] : heart rate was normal  [Normal S1, S2] : normal S1 and S2 [No murmur] : no murmur [Regular Rhythm] : with a regular rhythm [Conjunctival Erythema] : no conjunctival erythema [Boggy Nasal Turbinates] : no boggy and/or pale nasal turbinates [Pharyngeal erythema] : no pharyngeal erythema [Posterior Pharyngeal Cobblestoning] : no posterior pharyngeal cobblestoning [Clear Rhinorrhea] : no clear rhinorrhea was seen [Exudate] : no exudate [Wheezing] : no wheezing was heard [de-identified] : +linear hyperpigmented rash on the lower extermity.

## 2021-10-15 NOTE — HISTORY OF PRESENT ILLNESS
[de-identified] : \par 61 year old man with history of anaphylaxis to venom, history of recurrent mild urticaria and contact dermatitis here for evaluation of rash. \par \par Last month the patient developed fixed  pruritic erythematous rash, on upper and lower extremity which resolves with pigmentation. He was evaluated by Dermatologist and treated with oral steroid with temporary improvement. He is using Allegra without any help. No new skin products or medication. No on at the home has same rash. \par Old pic on the phone: vesicle lower extremity. The patient gardens and plays golf.  \par \par Reached maintenance allergen immunotherapy 4/2021.\par He was stung by a bee about 5 weeks ago, felt numbness in his fingers, he took Fexofenadine and all symptoms resolved. He had a systemic reaction with swelling and fainting 7/13/21. \par \par \par \par \par HYMENOPTERA VENOM ALLERGY:\par History of systemic reaction after a sting is an indication for further testing.\par - IgE positive to YELLOW JACKET AND WASP\par \par CONTACT DERMATITIS:\par Patch test positive to Fragrance mix , polymyxin B sulfate, cinnamic aldehyde, cananga odorata, cetylstearylalcohol, mehyldibromoglutanonitrile, thimerosal, ?- gold sodium thiosulfate\par \par . \par No history or symptoms of asthma, allergic rhinitis, food allergies.

## 2021-10-15 NOTE — REVIEW OF SYSTEMS
[Fatigue] : no fatigue [Fever] : no fever [Eye Discharge] : no eye discharge [Eye Redness] : no redness [Puffy Eyelids] : no puffy ~T eyelids [Bloodshot Eyes] : no bloodshot ~T eyes [Rhinorrhea] : no rhinorrhea [Snoring] : no snoring [Post Nasal Drip] : no post nasal drip [Sneezing] : no sneezing [Cyanosis] : no cyanosis [Edema] : no edema [Exercise Intolerance] : no persistence of exercise intolerance [Palpitations] : no palpitations [Wheezing Worsens With Exercise] : wheezing does not worsen with exercise [Wheezing] : no wheezing [de-identified] : see HPI

## 2021-11-16 ENCOUNTER — APPOINTMENT (OUTPATIENT)
Dept: PEDIATRIC ALLERGY IMMUNOLOGY | Facility: CLINIC | Age: 61
End: 2021-11-16

## 2021-11-16 PROCEDURE — 95117 IMMUNOTHERAPY INJECTIONS: CPT | Mod: 1L

## 2021-12-13 ENCOUNTER — APPOINTMENT (OUTPATIENT)
Dept: PEDIATRIC ALLERGY IMMUNOLOGY | Facility: CLINIC | Age: 61
End: 2021-12-13

## 2021-12-13 PROCEDURE — 95117 IMMUNOTHERAPY INJECTIONS: CPT | Mod: 1L

## 2022-01-12 ENCOUNTER — APPOINTMENT (OUTPATIENT)
Dept: PEDIATRIC ALLERGY IMMUNOLOGY | Facility: CLINIC | Age: 62
End: 2022-01-12

## 2022-01-12 PROCEDURE — 95117 IMMUNOTHERAPY INJECTIONS: CPT | Mod: 1L

## 2022-02-09 ENCOUNTER — APPOINTMENT (OUTPATIENT)
Dept: PEDIATRIC ALLERGY IMMUNOLOGY | Facility: CLINIC | Age: 62
End: 2022-02-09

## 2022-02-09 VITALS
OXYGEN SATURATION: 99 % | WEIGHT: 184 LBS | HEIGHT: 69 IN | SYSTOLIC BLOOD PRESSURE: 160 MMHG | HEART RATE: 70 BPM | BODY MASS INDEX: 27.25 KG/M2 | DIASTOLIC BLOOD PRESSURE: 72 MMHG | TEMPERATURE: 96.98 F

## 2022-02-09 VITALS — HEART RATE: 70 BPM

## 2022-02-09 DIAGNOSIS — L50.8 OTHER URTICARIA: ICD-10-CM

## 2022-02-09 PROCEDURE — 95117 IMMUNOTHERAPY INJECTIONS: CPT

## 2022-02-09 PROCEDURE — 99213 OFFICE O/P EST LOW 20 MIN: CPT | Mod: 25

## 2022-02-09 RX ORDER — MOMETASONE FUROATE 1 MG/G
0.1 OINTMENT TOPICAL TWICE DAILY
Qty: 1 | Refills: 2 | Status: COMPLETED | COMMUNITY
Start: 2021-10-13 | End: 2022-02-09

## 2022-02-13 NOTE — PHYSICAL EXAM
[Alert] : alert [Well Nourished] : well nourished [No Acute Distress] : no acute distress [Well Developed] : well developed [Sclera Not Icteric] : sclera not icteric [Normal TMs] : both tympanic membranes were normal [Normal Tonsils] : normal tonsils [Normal Rate and Effort] : normal respiratory rhythm and effort [No Crackles] : no crackles [Bilateral Audible Breath Sounds] : bilateral audible breath sounds [Normal Rate] : heart rate was normal  [Normal S1, S2] : normal S1 and S2 [No murmur] : no murmur [Regular Rhythm] : with a regular rhythm [Skin Intact] : skin intact  [No Rash] : no rash [Normal Mood] : mood was normal [Normal Affect] : affect was normal [Judgment and Insight Age Appropriate] : judgement and insight is age appropriate [Alert, Awake, Oriented as Age-Appropriate] : alert, awake, oriented as age appropriate [Conjunctival Erythema] : no conjunctival erythema [Boggy Nasal Turbinates] : no boggy and/or pale nasal turbinates [Pharyngeal erythema] : no pharyngeal erythema [Posterior Pharyngeal Cobblestoning] : no posterior pharyngeal cobblestoning [Clear Rhinorrhea] : no clear rhinorrhea was seen [Exudate] : no exudate [Wheezing] : no wheezing was heard [Patches] : no patches

## 2022-02-13 NOTE — REVIEW OF SYSTEMS
[Nl] : Integumentary [Fatigue] : no fatigue [Fever] : no fever [Eye Redness] : no redness [Eye Itching] : no itchy eyes [Puffy Eyelids] : no puffy ~T eyelids [Rhinorrhea] : no rhinorrhea [Nasal Dryness] : no dryness of the nose [Snoring] : no snoring [Hoarseness] : no hoarseness [Post Nasal Drip] : no post nasal drip [Sneezing] : no sneezing [Cough] : no cough [Wheezing Worsens With Exercise] : wheezing does not worsen with exercise [Wheezing] : no wheezing [Atopic Dermatitis] : no atopic dermatitis

## 2022-02-13 NOTE — HISTORY OF PRESENT ILLNESS
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 \par \par Recurrent urticaria is well controlled with half pills  of Allegra 180mg. Admits to developing urticaria when he skips the dose of Allegra\par CONTACT DERMATITIS: No recent rash. \par Patch test positive to Fragrance mix , polymyxin B sulfate, cinnamic aldehyde, cananga odorata, cetylstearylalcohol, mehyldibromoglutanonitrile, thimerosal, ?- gold sodium thiosulfate\par \par \par \par \par Reached maintenance allergen immunotherapy 4/2021.\par He was stung by a bee about 5 weeks ago, felt numbness in his fingers, he took Fexofenadine and all symptoms resolved. He had a systemic reaction with swelling and fainting 7/13/21. \par On IT for HB, WASP and mixed venom. Has epi pen. \par \par \par \par . \par No history or symptoms of asthma, allergic rhinitis, food allergies. \par

## 2022-03-16 ENCOUNTER — TRANSCRIPTION ENCOUNTER (OUTPATIENT)
Age: 62
End: 2022-03-16

## 2022-03-16 ENCOUNTER — APPOINTMENT (OUTPATIENT)
Dept: PEDIATRIC ALLERGY IMMUNOLOGY | Facility: CLINIC | Age: 62
End: 2022-03-16

## 2022-03-16 PROCEDURE — 95117 IMMUNOTHERAPY INJECTIONS: CPT

## 2022-04-13 ENCOUNTER — APPOINTMENT (OUTPATIENT)
Dept: PEDIATRIC ALLERGY IMMUNOLOGY | Facility: CLINIC | Age: 62
End: 2022-04-13

## 2022-04-13 PROCEDURE — 95117 IMMUNOTHERAPY INJECTIONS: CPT

## 2022-05-11 ENCOUNTER — APPOINTMENT (OUTPATIENT)
Dept: PEDIATRIC ALLERGY IMMUNOLOGY | Facility: CLINIC | Age: 62
End: 2022-05-11

## 2022-05-11 PROCEDURE — 95117 IMMUNOTHERAPY INJECTIONS: CPT | Mod: GC

## 2022-05-18 ENCOUNTER — APPOINTMENT (OUTPATIENT)
Dept: PEDIATRIC ALLERGY IMMUNOLOGY | Facility: CLINIC | Age: 62
End: 2022-05-18

## 2022-05-23 ENCOUNTER — NON-APPOINTMENT (OUTPATIENT)
Age: 62
End: 2022-05-23

## 2022-06-16 ENCOUNTER — APPOINTMENT (OUTPATIENT)
Dept: PEDIATRIC ALLERGY IMMUNOLOGY | Facility: CLINIC | Age: 62
End: 2022-06-16

## 2022-06-16 PROCEDURE — 95117 IMMUNOTHERAPY INJECTIONS: CPT

## 2022-07-13 ENCOUNTER — APPOINTMENT (OUTPATIENT)
Dept: PEDIATRIC ALLERGY IMMUNOLOGY | Facility: CLINIC | Age: 62
End: 2022-07-13

## 2022-07-13 PROCEDURE — 95149Z: CUSTOM

## 2022-07-13 PROCEDURE — 95117 IMMUNOTHERAPY INJECTIONS: CPT

## 2022-08-17 ENCOUNTER — APPOINTMENT (OUTPATIENT)
Dept: PEDIATRIC ALLERGY IMMUNOLOGY | Facility: CLINIC | Age: 62
End: 2022-08-17

## 2022-08-17 PROCEDURE — 95165 ANTIGEN THERAPY SERVICES: CPT

## 2022-08-17 PROCEDURE — 95117 IMMUNOTHERAPY INJECTIONS: CPT

## 2022-09-21 ENCOUNTER — APPOINTMENT (OUTPATIENT)
Dept: PEDIATRIC ALLERGY IMMUNOLOGY | Facility: CLINIC | Age: 62
End: 2022-09-21

## 2022-09-21 ENCOUNTER — APPOINTMENT (OUTPATIENT)
Age: 62
End: 2022-09-21

## 2022-09-21 VITALS
WEIGHT: 180 LBS | DIASTOLIC BLOOD PRESSURE: 74 MMHG | HEIGHT: 69 IN | SYSTOLIC BLOOD PRESSURE: 116 MMHG | HEART RATE: 89 BPM | OXYGEN SATURATION: 96 % | BODY MASS INDEX: 26.66 KG/M2

## 2022-09-21 PROCEDURE — 99204 OFFICE O/P NEW MOD 45 MIN: CPT | Mod: 25

## 2022-09-21 PROCEDURE — 29130 APPL FINGER SPLINT STATIC: CPT

## 2022-09-21 PROCEDURE — 95117 IMMUNOTHERAPY INJECTIONS: CPT

## 2022-09-21 PROCEDURE — 95165 ANTIGEN THERAPY SERVICES: CPT

## 2022-09-21 PROCEDURE — 73130 X-RAY EXAM OF HAND: CPT | Mod: RT

## 2022-10-06 ENCOUNTER — APPOINTMENT (OUTPATIENT)
Dept: ORTHOPEDIC SURGERY | Facility: CLINIC | Age: 62
End: 2022-10-06

## 2022-10-19 ENCOUNTER — APPOINTMENT (OUTPATIENT)
Dept: PEDIATRIC ALLERGY IMMUNOLOGY | Facility: CLINIC | Age: 62
End: 2022-10-19

## 2022-10-19 PROCEDURE — 95117 IMMUNOTHERAPY INJECTIONS: CPT

## 2022-10-19 PROCEDURE — 95165 ANTIGEN THERAPY SERVICES: CPT

## 2022-11-17 ENCOUNTER — NON-APPOINTMENT (OUTPATIENT)
Age: 62
End: 2022-11-17

## 2022-12-21 ENCOUNTER — APPOINTMENT (OUTPATIENT)
Dept: PEDIATRIC ALLERGY IMMUNOLOGY | Facility: CLINIC | Age: 62
End: 2022-12-21

## 2022-12-21 PROCEDURE — 95117 IMMUNOTHERAPY INJECTIONS: CPT | Mod: GC

## 2022-12-21 PROCEDURE — 95165 ANTIGEN THERAPY SERVICES: CPT

## 2023-01-18 ENCOUNTER — APPOINTMENT (OUTPATIENT)
Dept: PEDIATRIC ALLERGY IMMUNOLOGY | Facility: CLINIC | Age: 63
End: 2023-01-18
Payer: COMMERCIAL

## 2023-01-18 PROCEDURE — 95117 IMMUNOTHERAPY INJECTIONS: CPT

## 2023-01-18 PROCEDURE — 95149Z: CUSTOM

## 2023-02-10 ENCOUNTER — NON-APPOINTMENT (OUTPATIENT)
Age: 63
End: 2023-02-10

## 2023-03-31 ENCOUNTER — APPOINTMENT (OUTPATIENT)
Dept: PEDIATRIC ALLERGY IMMUNOLOGY | Facility: CLINIC | Age: 63
End: 2023-03-31
Payer: COMMERCIAL

## 2023-03-31 DIAGNOSIS — J30.1 ALLERGIC RHINITIS DUE TO POLLEN: ICD-10-CM

## 2023-03-31 DIAGNOSIS — J30.81 ALLERGIC RHINITIS DUE TO ANIMAL (CAT) (DOG) HAIR AND DANDER: ICD-10-CM

## 2023-03-31 DIAGNOSIS — J30.89 OTHER ALLERGIC RHINITIS: ICD-10-CM

## 2023-03-31 PROCEDURE — 95117 IMMUNOTHERAPY INJECTIONS: CPT

## 2023-03-31 PROCEDURE — 95165 ANTIGEN THERAPY SERVICES: CPT

## 2023-04-07 PROBLEM — J30.89 ALLERGIC RHINITIS DUE TO OTHER ALLERGEN: Status: RESOLVED | Noted: 2022-09-21 | Resolved: 2023-04-07

## 2023-04-07 PROBLEM — J30.1 ALLERGIC RHINITIS DUE TO POLLEN: Status: RESOLVED | Noted: 2022-09-21 | Resolved: 2023-04-07

## 2023-04-07 PROBLEM — J30.81 ALLERGIC RHINITIS DUE TO ANIMAL DANDER: Status: RESOLVED | Noted: 2022-09-21 | Resolved: 2023-04-07

## 2023-05-04 ENCOUNTER — APPOINTMENT (OUTPATIENT)
Dept: PEDIATRIC ALLERGY IMMUNOLOGY | Facility: CLINIC | Age: 63
End: 2023-05-04

## 2023-05-10 ENCOUNTER — APPOINTMENT (OUTPATIENT)
Dept: PEDIATRIC ALLERGY IMMUNOLOGY | Facility: CLINIC | Age: 63
End: 2023-05-10
Payer: COMMERCIAL

## 2023-05-10 PROCEDURE — 95149Z: CUSTOM

## 2023-05-10 PROCEDURE — 95117 IMMUNOTHERAPY INJECTIONS: CPT | Mod: GC

## 2023-05-12 ENCOUNTER — NON-APPOINTMENT (OUTPATIENT)
Age: 63
End: 2023-05-12

## 2023-06-02 ENCOUNTER — RX RENEWAL (OUTPATIENT)
Age: 63
End: 2023-06-02

## 2023-06-05 ENCOUNTER — APPOINTMENT (OUTPATIENT)
Dept: PEDIATRIC ALLERGY IMMUNOLOGY | Facility: CLINIC | Age: 63
End: 2023-06-05

## 2023-07-18 ENCOUNTER — APPOINTMENT (OUTPATIENT)
Dept: PEDIATRIC ALLERGY IMMUNOLOGY | Facility: CLINIC | Age: 63
End: 2023-07-18
Payer: COMMERCIAL

## 2023-07-18 PROCEDURE — 95117 IMMUNOTHERAPY INJECTIONS: CPT

## 2023-07-18 PROCEDURE — 95149Z: CUSTOM

## 2023-07-27 ENCOUNTER — APPOINTMENT (OUTPATIENT)
Dept: PEDIATRIC ALLERGY IMMUNOLOGY | Facility: CLINIC | Age: 63
End: 2023-07-27
Payer: COMMERCIAL

## 2023-07-27 PROCEDURE — 95117 IMMUNOTHERAPY INJECTIONS: CPT

## 2023-07-27 PROCEDURE — 95149Z: CUSTOM

## 2023-08-15 ENCOUNTER — APPOINTMENT (OUTPATIENT)
Dept: PEDIATRIC ALLERGY IMMUNOLOGY | Facility: CLINIC | Age: 63
End: 2023-08-15

## 2023-08-24 ENCOUNTER — APPOINTMENT (OUTPATIENT)
Dept: PEDIATRIC ALLERGY IMMUNOLOGY | Facility: CLINIC | Age: 63
End: 2023-08-24
Payer: COMMERCIAL

## 2023-08-24 PROCEDURE — 95117 IMMUNOTHERAPY INJECTIONS: CPT

## 2023-08-24 PROCEDURE — 95149Z: CUSTOM

## 2023-08-31 ENCOUNTER — APPOINTMENT (OUTPATIENT)
Dept: ORTHOPEDIC SURGERY | Facility: CLINIC | Age: 63
End: 2023-08-31

## 2023-08-31 DIAGNOSIS — M54.50 LOW BACK PAIN, UNSPECIFIED: ICD-10-CM

## 2023-09-12 NOTE — HISTORY OF PRESENT ILLNESS
[facility] [Home] : at home, [unfilled] , at the time of the visit. [Other Location: e.g. 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 He had a systemic reaction with swelling and fainting 7/13/21. \par \par - 3 weeks ago he developed about 30 itchy ?hives. He went to a dermatologist, who prescribed prednisone, rash disappeared but his legs continue to be itchy. His wife told him that his skin is very dry and gave him some kind of ointment but he is not using it. There were no associated shortness of breath or other allergic symptoms.  He has been taking Fexofenadine but itching persists. \par \par \par - He has been well, no interval issues. No interval episodes of urticaria or bee stings. \par - returns to complete venom testing\par \par HYMENOPTERA VENOM ALLERGY:\par History of systemic reaction after a sting is an indication for further testing.\par - IgE positive to YELLOW JACKET AND WASP\par \par CONTACT DERMATITIS:\par Patch test positive to Fragrance mix , polymyxin B sulfate, cinnamic aldehyde, cananga odorata, cetylstearylalcohol, mehyldibromoglutanonitrile, thimerosal, ?- gold sodium  thiosulfate\par \par

## 2023-09-21 ENCOUNTER — APPOINTMENT (OUTPATIENT)
Dept: PEDIATRIC ALLERGY IMMUNOLOGY | Facility: CLINIC | Age: 63
End: 2023-09-21

## 2023-09-28 ENCOUNTER — APPOINTMENT (OUTPATIENT)
Dept: PEDIATRIC ALLERGY IMMUNOLOGY | Facility: CLINIC | Age: 63
End: 2023-09-28
Payer: COMMERCIAL

## 2023-09-28 PROCEDURE — 95149Z: CUSTOM

## 2023-09-28 PROCEDURE — 95117 IMMUNOTHERAPY INJECTIONS: CPT

## 2023-10-05 DIAGNOSIS — Z12.5 ENCOUNTER FOR SCREENING FOR MALIGNANT NEOPLASM OF PROSTATE: ICD-10-CM

## 2023-10-06 ENCOUNTER — APPOINTMENT (OUTPATIENT)
Dept: UROLOGY | Facility: CLINIC | Age: 63
End: 2023-10-06
Payer: COMMERCIAL

## 2023-10-06 VITALS
DIASTOLIC BLOOD PRESSURE: 74 MMHG | SYSTOLIC BLOOD PRESSURE: 125 MMHG | TEMPERATURE: 206.78 F | BODY MASS INDEX: 26.66 KG/M2 | WEIGHT: 180 LBS | OXYGEN SATURATION: 97 % | HEIGHT: 69 IN | HEART RATE: 63 BPM

## 2023-10-06 DIAGNOSIS — Z84.1 FAMILY HISTORY OF DISORDERS OF KIDNEY AND URETER: ICD-10-CM

## 2023-10-06 PROCEDURE — 99203 OFFICE O/P NEW LOW 30 MIN: CPT

## 2023-10-08 LAB
ALBUMIN SERPL ELPH-MCNC: 5 G/DL
ANION GAP SERPL CALC-SCNC: 14 MMOL/L
APPEARANCE: CLEAR
BACTERIA: NEGATIVE /HPF
BILIRUBIN URINE: NEGATIVE
BLOOD URINE: NEGATIVE
BUN SERPL-MCNC: 16 MG/DL
CALCIUM SERPL-MCNC: 9.7 MG/DL
CAST: 0 /LPF
CHLORIDE SERPL-SCNC: 103 MMOL/L
CO2 SERPL-SCNC: 23 MMOL/L
COLOR: YELLOW
CREAT SERPL-MCNC: 0.94 MG/DL
EGFR: 91 ML/MIN/1.73M2
EPITHELIAL CELLS: 0 /HPF
GLUCOSE QUALITATIVE U: NEGATIVE MG/DL
GLUCOSE SERPL-MCNC: 86 MG/DL
KETONES URINE: NEGATIVE MG/DL
LEUKOCYTE ESTERASE URINE: NEGATIVE
MICROSCOPIC-UA: NORMAL
NITRITE URINE: NEGATIVE
PH URINE: 7.5
PHOSPHATE SERPL-MCNC: 2.9 MG/DL
POTASSIUM SERPL-SCNC: 4.9 MMOL/L
PROTEIN URINE: NEGATIVE MG/DL
PSA SERPL-MCNC: 1.31 NG/ML
RED BLOOD CELLS URINE: 2 /HPF
SODIUM SERPL-SCNC: 140 MMOL/L
SPECIFIC GRAVITY URINE: 1.02
UROBILINOGEN URINE: 0.2 MG/DL
WHITE BLOOD CELLS URINE: 0 /HPF

## 2023-10-14 LAB — BACTERIA UR CULT: NORMAL

## 2023-10-20 ENCOUNTER — APPOINTMENT (OUTPATIENT)
Dept: UROLOGY | Facility: CLINIC | Age: 63
End: 2023-10-20
Payer: COMMERCIAL

## 2023-10-20 VITALS
OXYGEN SATURATION: 99 % | TEMPERATURE: 207.32 F | DIASTOLIC BLOOD PRESSURE: 72 MMHG | HEART RATE: 67 BPM | SYSTOLIC BLOOD PRESSURE: 150 MMHG

## 2023-10-20 PROCEDURE — 76872 US TRANSRECTAL: CPT

## 2023-10-20 PROCEDURE — 99213 OFFICE O/P EST LOW 20 MIN: CPT

## 2023-11-09 ENCOUNTER — APPOINTMENT (OUTPATIENT)
Dept: PEDIATRIC ALLERGY IMMUNOLOGY | Facility: CLINIC | Age: 63
End: 2023-11-09
Payer: COMMERCIAL

## 2023-11-09 PROCEDURE — 95117 IMMUNOTHERAPY INJECTIONS: CPT

## 2023-11-09 PROCEDURE — 95149Z: CUSTOM

## 2023-12-19 ENCOUNTER — APPOINTMENT (OUTPATIENT)
Dept: PEDIATRIC ALLERGY IMMUNOLOGY | Facility: CLINIC | Age: 63
End: 2023-12-19
Payer: COMMERCIAL

## 2023-12-19 PROCEDURE — 95117 IMMUNOTHERAPY INJECTIONS: CPT

## 2023-12-19 PROCEDURE — 95149Z: CUSTOM

## 2024-02-06 ENCOUNTER — APPOINTMENT (OUTPATIENT)
Dept: PEDIATRIC ALLERGY IMMUNOLOGY | Facility: CLINIC | Age: 64
End: 2024-02-06
Payer: COMMERCIAL

## 2024-02-06 DIAGNOSIS — T63.441A TOXIC EFFECT OF VENOM OF BEES, ACCIDENTAL (UNINTENTIONAL), INITIAL ENCOUNTER: ICD-10-CM

## 2024-02-06 DIAGNOSIS — Z91.038 OTHER INSECT ALLERGY STATUS: ICD-10-CM

## 2024-02-06 PROCEDURE — 95117 IMMUNOTHERAPY INJECTIONS: CPT

## 2024-02-06 PROCEDURE — 95149Z: CUSTOM

## 2024-02-08 PROBLEM — T63.441A ANAPHYLACTIC REACTION TO BEE STING, ACCIDENTAL OR UNINTENTIONAL, INITIAL ENCOUNTER: Noted: 2020-08-11

## 2024-02-08 PROBLEM — Z91.038 ALLERGY TO HYMENOPTERA VENOM: Status: ACTIVE | Noted: 2024-02-08

## 2024-03-20 ENCOUNTER — APPOINTMENT (OUTPATIENT)
Dept: PEDIATRIC ALLERGY IMMUNOLOGY | Facility: CLINIC | Age: 64
End: 2024-03-20
Payer: COMMERCIAL

## 2024-03-20 VITALS
SYSTOLIC BLOOD PRESSURE: 122 MMHG | DIASTOLIC BLOOD PRESSURE: 77 MMHG | WEIGHT: 184 LBS | HEIGHT: 69 IN | OXYGEN SATURATION: 99 % | BODY MASS INDEX: 27.25 KG/M2 | HEART RATE: 100 BPM

## 2024-03-20 DIAGNOSIS — J30.9 ALLERGIC RHINITIS, UNSPECIFIED: ICD-10-CM

## 2024-03-20 DIAGNOSIS — L50.8 OTHER URTICARIA: ICD-10-CM

## 2024-03-20 PROCEDURE — 95149Z: CUSTOM

## 2024-03-20 PROCEDURE — 99214 OFFICE O/P EST MOD 30 MIN: CPT | Mod: 25

## 2024-03-20 PROCEDURE — 95117 IMMUNOTHERAPY INJECTIONS: CPT

## 2024-03-20 RX ORDER — FEXOFENADINE HCL 60 MG/1
60 TABLET, FILM COATED ORAL
Qty: 60 | Refills: 2 | Status: DISCONTINUED | COMMUNITY
Start: 2022-02-09 | End: 2024-03-20

## 2024-03-20 RX ORDER — EPINEPHRINE 0.3 MG/.3ML
0.3 INJECTION INTRAMUSCULAR
Qty: 2 | Refills: 1 | Status: ACTIVE | COMMUNITY
Start: 2020-08-11 | End: 1900-01-01

## 2024-03-20 RX ORDER — FEXOFENADINE HYDROCHLORIDE 180 MG/1
180 TABLET ORAL DAILY
Qty: 30 | Refills: 1 | Status: ACTIVE | COMMUNITY
Start: 2019-01-10

## 2024-03-20 RX ORDER — AMLODIPINE BESYLATE 5 MG/1
TABLET ORAL
Refills: 0 | Status: COMPLETED | COMMUNITY

## 2024-03-20 NOTE — PHYSICAL EXAM
[Alert] : alert [Healthy Appearance] : healthy appearance [Well Nourished] : well nourished [Well Developed] : well developed [No Acute Distress] : no acute distress [Normal Pupil & Iris Size/Symmetry] : normal pupil and iris size and symmetry [No Discharge] : no discharge [No Photophobia] : no photophobia [Normal TMs] : both tympanic membranes were normal [Sclera Not Icteric] : sclera not icteric [Normal Nasal Mucosa] : the nasal mucosa was normal [Normal Lips/Tongue] : the lips and tongue were normal [No Thrush] : no thrush [Normal Outer Ear/Nose] : the ears and nose were normal in appearance [Normal Tonsils] : normal tonsils [Pale mucosa] : pale mucosa [Supple] : the neck was supple [No Crackles] : no crackles [Normal Rate and Effort] : normal respiratory rhythm and effort [Bilateral Audible Breath Sounds] : bilateral audible breath sounds [No Retractions] : no retractions [Normal Rate] : heart rate was normal  [Normal S1, S2] : normal S1 and S2 [Regular Rhythm] : with a regular rhythm [No murmur] : no murmur [Soft] : abdomen soft [No HSM] : no hepato-splenomegaly [Not Distended] : not distended [Not Tender] : non-tender [Normal Cervical Lymph Nodes] : cervical [Skin Intact] : skin intact  [No Rash] : no rash [No Skin Lesions] : no skin lesions [No clubbing] : no clubbing [No Edema] : no edema [No Cyanosis] : no cyanosis [Normal Affect] : affect was normal [Normal Mood] : mood was normal [Alert, Awake, Oriented as Age-Appropriate] : alert, awake, oriented as age appropriate [Boggy Nasal Turbinates] : no boggy and/or pale nasal turbinates [Posterior Pharyngeal Cobblestoning] : no posterior pharyngeal cobblestoning [Wheezing] : no wheezing was heard [Clear Rhinorrhea] : no clear rhinorrhea was seen

## 2024-03-20 NOTE — REASON FOR VISIT
[Routine Follow-Up] : a routine follow-up visit for [Hives] : hives [To Insect Venom] : allergy to insect venom

## 2024-03-20 NOTE — HISTORY OF PRESENT ILLNESS
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  Last follow up visit Feb. 2022.   3/20/24:   Taking Allegra 1/2 tablet daily which controls hives. No hives this year. Last year had hives 1 or 2 times. Takes 1 Allegra and symtpoms resolve within an hour.   No interval insect sings. Tolerating venom IT without issue.    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    Recurrent urticaria is well controlled with half pills of Allegra 180mg. Admits to developing urticaria when he skips the dose of Allegra  CONTACT DERMATITIS: No recent rash.  Patch test positive to Fragrance mix , polymyxin B sulfate, cinnamic aldehyde, cananga odorata, cetylstearylalcohol, mehyldibromoglutanonitrile, thimerosal, ?- gold sodium thiosulfate          Reached maintenance allergen immunotherapy 4/2021.  He was stung by a bee about 5 weeks ago, felt numbness in his fingers, he took Fexofenadine and all symptoms resolved. He had a systemic reaction with swelling and fainting 7/13/21.  On IT for HB, WASP and mixed venom. Has epi pen.        .  No history or symptoms of asthma, allergic rhinitis, food allergies.

## 2024-03-20 NOTE — END OF VISIT
[] : Fellow [FreeTextEntry3] :  62 yo male with venom allergy and chronic spontaneous urticaria: - continue venom Immunotherapy  - hives are well controlled with low dose Fexofenadine , to continue

## 2024-04-26 ENCOUNTER — APPOINTMENT (OUTPATIENT)
Dept: UROLOGY | Facility: CLINIC | Age: 64
End: 2024-04-26
Payer: COMMERCIAL

## 2024-04-26 VITALS
DIASTOLIC BLOOD PRESSURE: 62 MMHG | HEART RATE: 72 BPM | TEMPERATURE: 97.5 F | WEIGHT: 184 LBS | OXYGEN SATURATION: 97 % | BODY MASS INDEX: 27.25 KG/M2 | SYSTOLIC BLOOD PRESSURE: 106 MMHG | HEIGHT: 69 IN

## 2024-04-26 DIAGNOSIS — N40.0 BENIGN PROSTATIC HYPERPLASIA WITHOUT LOWER URINARY TRACT SYMPMS: ICD-10-CM

## 2024-04-26 PROCEDURE — 99213 OFFICE O/P EST LOW 20 MIN: CPT

## 2024-04-26 RX ORDER — TAMSULOSIN HYDROCHLORIDE 0.4 MG/1
0.4 CAPSULE ORAL
Qty: 180 | Refills: 0 | Status: ACTIVE | COMMUNITY
Start: 2023-10-20 | End: 1900-01-01

## 2024-04-26 RX ORDER — DUTASTERIDE 0.5 MG/1
0.5 CAPSULE, LIQUID FILLED ORAL
Qty: 180 | Refills: 0 | Status: ACTIVE | COMMUNITY
Start: 2023-10-20 | End: 1900-01-01

## 2024-04-26 NOTE — REVIEW OF SYSTEMS
[Dry Eyes] : dryness of the eyes [Poor quality erections] : Poor quality erections [Seen by urologist before (Name)  ___] : Preciously seen by a urologist: [unfilled] [History of kidney stones] : history of kidney stones [Wake up at night to urinate  How many times?  ___] : wakes up to urinate [unfilled] times during the night [Slow urine stream] : slow urine stream [Negative] : Heme/Lymph

## 2024-04-26 NOTE — ASSESSMENT
[FreeTextEntry1] : 62 y/o male with LUTS Denies FHx PCA, or any urinary issues DEVON Patient drinks orange juice and states he drinks adequate amounts of water. Comes today for US prostate.  10/26/2023 TRUS:  Prostate Volume: 51.8 cc Length : 57 mm Width : 48 mm Height : 36 mm Impression: enlarged prostate gland  Comes today after 6 months with dutasteride + tamsulosin for new TRUS  TRUS today: Prostate Volume: 51 cc Length : 55 mm Width : 47 mm Height : 39 mm Impression: enlarged prostate gland, exact measurement as prior imaging  The patient refers improvement of symptoms despite the reduced volume decrease. Will F/U in 6 months with new TRUS. Treatment is renewed

## 2024-04-26 NOTE — HISTORY OF PRESENT ILLNESS
[FreeTextEntry1] : 64 y/o male with LUTS Denies FHx PCA, or any urinary issues DEVON Patient drinks orange juice and states he drinks adequate amounts of water. Comes today for US prostate.  10/26/2023 TRUS:  Prostate Volume: 51.8 cc Length : 57 mm Width : 48 mm Height : 36 mm Impression: enlarged prostate gland  Comes today after 6 months with dutasteride + tamsulosin for new TRUS

## 2024-05-09 ENCOUNTER — APPOINTMENT (OUTPATIENT)
Dept: PEDIATRIC ALLERGY IMMUNOLOGY | Facility: CLINIC | Age: 64
End: 2024-05-09
Payer: COMMERCIAL

## 2024-05-09 PROCEDURE — 95117 IMMUNOTHERAPY INJECTIONS: CPT

## 2024-05-09 PROCEDURE — 95147 ANTIGEN THERAPY SERVICES: CPT

## 2024-05-28 DIAGNOSIS — Z82.3 FAMILY HISTORY OF STROKE: ICD-10-CM

## 2024-05-28 RX ORDER — CHOLECALCIFEROL (VITAMIN D3) 25 MCG
25 MCG TABLET ORAL DAILY
Refills: 0 | Status: ACTIVE | COMMUNITY

## 2024-05-30 ENCOUNTER — NON-APPOINTMENT (OUTPATIENT)
Age: 64
End: 2024-05-30

## 2024-05-30 ENCOUNTER — APPOINTMENT (OUTPATIENT)
Dept: CARDIOLOGY | Facility: CLINIC | Age: 64
End: 2024-05-30
Payer: COMMERCIAL

## 2024-05-30 VITALS
DIASTOLIC BLOOD PRESSURE: 79 MMHG | OXYGEN SATURATION: 98 % | HEIGHT: 69 IN | HEART RATE: 76 BPM | WEIGHT: 182 LBS | BODY MASS INDEX: 26.96 KG/M2 | SYSTOLIC BLOOD PRESSURE: 124 MMHG

## 2024-05-30 DIAGNOSIS — I10 ESSENTIAL (PRIMARY) HYPERTENSION: ICD-10-CM

## 2024-05-30 DIAGNOSIS — R07.89 OTHER CHEST PAIN: ICD-10-CM

## 2024-05-30 DIAGNOSIS — S62.636A DISPLACED FRACTURE OF DISTAL PHALANX OF RIGHT LITTLE FINGER, INITIAL ENCOUNTER FOR CLOSED FRACTURE: ICD-10-CM

## 2024-05-30 DIAGNOSIS — S61.219A LACERATION W/OUT FOREIGN BODY OF UNSPECIFIED FINGER W/OUT DAMAGE TO NAIL, INITIAL ENCOUNTER: ICD-10-CM

## 2024-05-30 DIAGNOSIS — S69.91XD UNSPECIFIED INJURY OF RIGHT WRIST, HAND AND FINGER(S), SUBSEQUENT ENCOUNTER: ICD-10-CM

## 2024-05-30 DIAGNOSIS — S61.309A UNSPECIFIED OPEN WOUND OF UNSPECIFIED FINGER WITH DAMAGE TO NAIL, INITIAL ENCOUNTER: ICD-10-CM

## 2024-05-30 PROCEDURE — 93000 ELECTROCARDIOGRAM COMPLETE: CPT

## 2024-05-30 PROCEDURE — 99203 OFFICE O/P NEW LOW 30 MIN: CPT | Mod: 25

## 2024-06-03 PROBLEM — S69.91XD INJURY OF RIGHT HAND, SUBSEQUENT ENCOUNTER: Status: RESOLVED | Noted: 2022-09-21 | Resolved: 2024-06-03

## 2024-06-03 PROBLEM — S61.309A NAIL AVULSION, FINGER: Status: RESOLVED | Noted: 2022-09-21 | Resolved: 2024-06-03

## 2024-06-03 PROBLEM — S61.219A LACERATION OF FINGER OF RIGHT HAND: Status: RESOLVED | Noted: 2022-09-21 | Resolved: 2024-06-03

## 2024-06-03 PROBLEM — S62.636A FRACTURE OF DISTAL PHALANX OF RIGHT LITTLE FINGER: Status: RESOLVED | Noted: 2022-09-21 | Resolved: 2024-06-03

## 2024-06-03 NOTE — DISCUSSION/SUMMARY
[Unlikely Cardiac Ischemia (Low Prob.)] : chest pain unlikely to represent cardiac ischemia (low probability) [Hypertension] : hypertension [Stable] : stable [FreeTextEntry1] : Currently stable from a cardiovascular standpoint. Normotensive. Euvolemic. Atypical chest pains. Continue current medications. ECG completed today and reviewed (findings as noted above). Will schedule an exercise ECG stress test to rule out any significant CAD. In addition, will schedule an echocardiogram to assess his cardiac structures and function. Pending the test results, I will make further recommendations. [EKG obtained to assist in diagnosis and management of assessed problem(s)] : EKG obtained to assist in diagnosis and management of assessed problem(s)

## 2024-06-03 NOTE — HISTORY OF PRESENT ILLNESS
[FreeTextEntry1] : Has been experiencing occasional chest pains unrelated to exertion. Denies shortness of breath or palpitations. Chest pain has been going on for awhile and sometimes it may last all day although it may come on and off.

## 2024-06-03 NOTE — CARDIOLOGY SUMMARY
[de-identified] : 05/30/24 - normal sinus rhythm, LAE, nonspecific T-wave abnormality [de-identified] : 09/22/11 (cardiac CTA) - patent coronaries, LVEF 66%

## 2024-06-27 ENCOUNTER — APPOINTMENT (OUTPATIENT)
Dept: PEDIATRIC ALLERGY IMMUNOLOGY | Facility: CLINIC | Age: 64
End: 2024-06-27
Payer: COMMERCIAL

## 2024-06-27 DIAGNOSIS — J30.1 ALLERGIC RHINITIS DUE TO POLLEN: ICD-10-CM

## 2024-06-27 DIAGNOSIS — J30.81 ALLERGIC RHINITIS DUE TO ANIMAL (CAT) (DOG) HAIR AND DANDER: ICD-10-CM

## 2024-06-27 DIAGNOSIS — Z51.6 ENCOUNTER FOR DESENSITIZATION TO ALLERGENS: ICD-10-CM

## 2024-06-27 DIAGNOSIS — T63.481A TOXIC EFFECT OF VENOM OF OTHER ARTHROPOD, ACCIDENTAL (UNINTENTIONAL), INITIAL ENCOUNTER: ICD-10-CM

## 2024-06-27 DIAGNOSIS — J30.89 OTHER ALLERGIC RHINITIS: ICD-10-CM

## 2024-06-27 PROCEDURE — 95117 IMMUNOTHERAPY INJECTIONS: CPT

## 2024-07-22 ENCOUNTER — RESULT REVIEW (OUTPATIENT)
Age: 64
End: 2024-07-22

## 2024-07-23 ENCOUNTER — APPOINTMENT (OUTPATIENT)
Dept: CV DIAGNOSTICS | Facility: HOSPITAL | Age: 64
End: 2024-07-23

## 2024-07-23 ENCOUNTER — APPOINTMENT (OUTPATIENT)
Dept: CV DIAGNOSITCS | Facility: HOSPITAL | Age: 64
End: 2024-07-23

## 2024-07-23 ENCOUNTER — NON-APPOINTMENT (OUTPATIENT)
Age: 64
End: 2024-07-23

## 2024-07-23 ENCOUNTER — RESULT REVIEW (OUTPATIENT)
Age: 64
End: 2024-07-23

## 2024-08-15 ENCOUNTER — APPOINTMENT (OUTPATIENT)
Dept: PEDIATRIC ALLERGY IMMUNOLOGY | Facility: CLINIC | Age: 64
End: 2024-08-15
Payer: COMMERCIAL

## 2024-08-15 DIAGNOSIS — J30.1 ALLERGIC RHINITIS DUE TO POLLEN: ICD-10-CM

## 2024-08-15 DIAGNOSIS — T63.481A TOXIC EFFECT OF VENOM OF OTHER ARTHROPOD, ACCIDENTAL (UNINTENTIONAL), INITIAL ENCOUNTER: ICD-10-CM

## 2024-08-15 DIAGNOSIS — Z51.6 ENCOUNTER FOR DESENSITIZATION TO ALLERGENS: ICD-10-CM

## 2024-08-15 PROCEDURE — 95149Z: CUSTOM

## 2024-08-15 PROCEDURE — 95117 IMMUNOTHERAPY INJECTIONS: CPT

## 2024-10-03 ENCOUNTER — APPOINTMENT (OUTPATIENT)
Dept: UROLOGY | Facility: CLINIC | Age: 64
End: 2024-10-03
Payer: COMMERCIAL

## 2024-10-03 VITALS
DIASTOLIC BLOOD PRESSURE: 79 MMHG | BODY MASS INDEX: 27.11 KG/M2 | WEIGHT: 183 LBS | RESPIRATION RATE: 16 BRPM | OXYGEN SATURATION: 95 % | SYSTOLIC BLOOD PRESSURE: 126 MMHG | HEART RATE: 83 BPM | TEMPERATURE: 97.8 F | HEIGHT: 69 IN

## 2024-10-03 DIAGNOSIS — N40.1 BENIGN PROSTATIC HYPERPLASIA WITH LOWER URINARY TRACT SYMPMS: ICD-10-CM

## 2024-10-03 PROCEDURE — 99213 OFFICE O/P EST LOW 20 MIN: CPT

## 2024-10-03 NOTE — HISTORY OF PRESENT ILLNESS
[FreeTextEntry1] : 65 y/o male with BPH with LUTS, here for follow up. He is due for TRUS in 3 weeks. Denies FHx PCA, or any urinary issues DEVON Patient drinks orange juice and states he drinks adequate amounts of water.  FHx of PCA: denies Tobacco use: never smoke Last PSA: 1.31 ng/mL (10/05/23) Last creatinine: 0.94 mg/dL (10/05/23) Last UCx: negative (10/05/23) Uro Meds: Dutasteride 0.5 mg, Tamsulosin 0.4 mg  10/26/2023 TRUS: Prostate Volume: 51.8 cc Length : 57 mm Width : 48 mm Height : 36 mm Impression: enlarged prostate gland  04/26/2024 - TRUS: Prostate Volume: 51 cc Length : 55 mm Width : 47 mm Height : 39 mm Impression: enlarged prostate gland, exact measurement as prior imaging

## 2024-10-03 NOTE — ASSESSMENT
[FreeTextEntry1] : 65 y/o male with BPH with LUTS, here for follow up. He is due for TRUS in 3 weeks. Denies FHx PCA, or any urinary issues DEVON Patient drinks orange juice and states he drinks adequate amounts of water.  FHx of PCA: denies Tobacco use: never smoke Last PSA: 1.31 ng/mL (10/05/23) Last creatinine: 0.94 mg/dL (10/05/23) Last UCx: negative (10/05/23) Uro Meds: Dutasteride 0.5 mg, Tamsulosin 0.4 mg  10/26/2023 TRUS: Prostate Volume: 51.8 cc Length : 57 mm Width : 48 mm Height : 36 mm Impression: enlarged prostate gland  04/26/2024 - TRUS: Prostate Volume: 51 cc Length : 55 mm Width : 47 mm Height : 39 mm Impression: enlarged prostate gland, exact measurement as prior imaging  Tamsulosin is renewed. Pending renewal of Dutasteride, after TRUS check.

## 2024-10-10 ENCOUNTER — APPOINTMENT (OUTPATIENT)
Dept: PEDIATRIC ALLERGY IMMUNOLOGY | Facility: CLINIC | Age: 64
End: 2024-10-10

## 2024-10-10 DIAGNOSIS — Z51.6 ENCOUNTER FOR DESENSITIZATION TO ALLERGENS: ICD-10-CM

## 2024-10-10 DIAGNOSIS — T63.481A TOXIC EFFECT OF VENOM OF OTHER ARTHROPOD, ACCIDENTAL (UNINTENTIONAL), INITIAL ENCOUNTER: ICD-10-CM

## 2024-10-10 PROCEDURE — 95117 IMMUNOTHERAPY INJECTIONS: CPT

## 2024-11-04 ENCOUNTER — APPOINTMENT (OUTPATIENT)
Dept: UROLOGY | Facility: CLINIC | Age: 64
End: 2024-11-04

## 2024-12-09 ENCOUNTER — APPOINTMENT (OUTPATIENT)
Dept: UROLOGY | Facility: CLINIC | Age: 64
End: 2024-12-09
Payer: COMMERCIAL

## 2024-12-09 VITALS
OXYGEN SATURATION: 95 % | BODY MASS INDEX: 27.4 KG/M2 | HEART RATE: 81 BPM | WEIGHT: 185 LBS | HEIGHT: 69 IN | TEMPERATURE: 96.9 F | DIASTOLIC BLOOD PRESSURE: 70 MMHG | SYSTOLIC BLOOD PRESSURE: 118 MMHG

## 2024-12-09 DIAGNOSIS — N40.0 BENIGN PROSTATIC HYPERPLASIA WITHOUT LOWER URINARY TRACT SYMPMS: ICD-10-CM

## 2024-12-09 PROCEDURE — 76872 US TRANSRECTAL: CPT

## 2024-12-09 PROCEDURE — 99213 OFFICE O/P EST LOW 20 MIN: CPT

## 2024-12-10 ENCOUNTER — APPOINTMENT (OUTPATIENT)
Dept: PEDIATRIC ALLERGY IMMUNOLOGY | Facility: CLINIC | Age: 64
End: 2024-12-10
Payer: COMMERCIAL

## 2024-12-10 DIAGNOSIS — Z51.6 ENCOUNTER FOR DESENSITIZATION TO ALLERGENS: ICD-10-CM

## 2024-12-10 DIAGNOSIS — T63.481A TOXIC EFFECT OF VENOM OF OTHER ARTHROPOD, ACCIDENTAL (UNINTENTIONAL), INITIAL ENCOUNTER: ICD-10-CM

## 2024-12-10 PROCEDURE — 95149Z: CUSTOM

## 2024-12-10 PROCEDURE — 95117 IMMUNOTHERAPY INJECTIONS: CPT

## 2025-02-13 ENCOUNTER — APPOINTMENT (OUTPATIENT)
Dept: PEDIATRIC ALLERGY IMMUNOLOGY | Facility: CLINIC | Age: 65
End: 2025-02-13
Payer: COMMERCIAL

## 2025-02-13 DIAGNOSIS — J30.89 OTHER ALLERGIC RHINITIS: ICD-10-CM

## 2025-02-13 DIAGNOSIS — T63.481A TOXIC EFFECT OF VENOM OF OTHER ARTHROPOD, ACCIDENTAL (UNINTENTIONAL), INITIAL ENCOUNTER: ICD-10-CM

## 2025-02-13 DIAGNOSIS — J30.81 ALLERGIC RHINITIS DUE TO ANIMAL (CAT) (DOG) HAIR AND DANDER: ICD-10-CM

## 2025-02-13 DIAGNOSIS — Z51.6 ENCOUNTER FOR DESENSITIZATION TO ALLERGENS: ICD-10-CM

## 2025-02-13 DIAGNOSIS — J30.1 ALLERGIC RHINITIS DUE TO POLLEN: ICD-10-CM

## 2025-02-13 PROCEDURE — 95145 ANTIGEN THERAPY SERVICES: CPT

## 2025-02-13 PROCEDURE — 95117 IMMUNOTHERAPY INJECTIONS: CPT

## 2025-04-17 ENCOUNTER — APPOINTMENT (OUTPATIENT)
Dept: PEDIATRIC ALLERGY IMMUNOLOGY | Facility: CLINIC | Age: 65
End: 2025-04-17
Payer: COMMERCIAL

## 2025-04-17 DIAGNOSIS — T63.481A TOXIC EFFECT OF VENOM OF OTHER ARTHROPOD, ACCIDENTAL (UNINTENTIONAL), INITIAL ENCOUNTER: ICD-10-CM

## 2025-04-17 DIAGNOSIS — Z51.6 ENCOUNTER FOR DESENSITIZATION TO ALLERGENS: ICD-10-CM

## 2025-04-17 PROCEDURE — 95117 IMMUNOTHERAPY INJECTIONS: CPT

## 2025-06-09 ENCOUNTER — APPOINTMENT (OUTPATIENT)
Dept: UROLOGY | Facility: CLINIC | Age: 65
End: 2025-06-09

## 2025-06-26 ENCOUNTER — APPOINTMENT (OUTPATIENT)
Dept: PEDIATRIC ALLERGY IMMUNOLOGY | Facility: CLINIC | Age: 65
End: 2025-06-26
Payer: COMMERCIAL

## 2025-06-26 PROCEDURE — 95117 IMMUNOTHERAPY INJECTIONS: CPT

## 2025-06-26 PROCEDURE — 95149Z: CUSTOM

## 2025-06-30 ENCOUNTER — LABORATORY RESULT (OUTPATIENT)
Age: 65
End: 2025-06-30

## 2025-07-01 ENCOUNTER — NON-APPOINTMENT (OUTPATIENT)
Age: 65
End: 2025-07-01

## 2025-07-01 ENCOUNTER — APPOINTMENT (OUTPATIENT)
Dept: UROLOGY | Facility: CLINIC | Age: 65
End: 2025-07-01
Payer: COMMERCIAL

## 2025-07-01 VITALS
DIASTOLIC BLOOD PRESSURE: 83 MMHG | OXYGEN SATURATION: 96 % | WEIGHT: 183 LBS | HEIGHT: 69 IN | BODY MASS INDEX: 27.11 KG/M2 | HEART RATE: 72 BPM | SYSTOLIC BLOOD PRESSURE: 136 MMHG

## 2025-07-01 PROBLEM — N52.9 ERECTILE DYSFUNCTION OF ORGANIC ORIGIN: Status: ACTIVE | Noted: 2025-07-01

## 2025-07-01 PROCEDURE — 76872 US TRANSRECTAL: CPT

## 2025-07-01 PROCEDURE — 99214 OFFICE O/P EST MOD 30 MIN: CPT

## 2025-07-01 RX ORDER — SILDENAFIL 20 MG/1
20 TABLET ORAL DAILY
Qty: 30 | Refills: 1 | Status: ACTIVE | COMMUNITY
Start: 2025-07-01 | End: 1900-01-01

## 2025-07-02 LAB
APPEARANCE: CLEAR
BILIRUBIN URINE: NEGATIVE
BLOOD URINE: ABNORMAL
COLOR: YELLOW
GLUCOSE QUALITATIVE U: NEGATIVE MG/DL
KETONES URINE: NEGATIVE MG/DL
LEUKOCYTE ESTERASE URINE: NEGATIVE
NITRITE URINE: NEGATIVE
PH URINE: 5.5
PROTEIN URINE: NEGATIVE MG/DL
SPECIFIC GRAVITY URINE: 1.02
UROBILINOGEN URINE: 0.2 MG/DL

## 2025-08-28 ENCOUNTER — APPOINTMENT (OUTPATIENT)
Dept: PEDIATRIC ALLERGY IMMUNOLOGY | Facility: CLINIC | Age: 65
End: 2025-08-28

## 2025-08-28 DIAGNOSIS — T63.481A TOXIC EFFECT OF VENOM OF OTHER ARTHROPOD, ACCIDENTAL (UNINTENTIONAL), INITIAL ENCOUNTER: ICD-10-CM

## 2025-08-28 DIAGNOSIS — J30.89 OTHER ALLERGIC RHINITIS: ICD-10-CM

## 2025-08-28 DIAGNOSIS — J30.1 ALLERGIC RHINITIS DUE TO POLLEN: ICD-10-CM

## 2025-08-28 DIAGNOSIS — J30.81 ALLERGIC RHINITIS DUE TO ANIMAL (CAT) (DOG) HAIR AND DANDER: ICD-10-CM

## 2025-08-28 DIAGNOSIS — Z51.6 ENCOUNTER FOR DESENSITIZATION TO ALLERGENS: ICD-10-CM

## 2025-08-28 PROCEDURE — 95117 IMMUNOTHERAPY INJECTIONS: CPT
